# Patient Record
Sex: FEMALE | Race: WHITE | NOT HISPANIC OR LATINO | Employment: OTHER | URBAN - METROPOLITAN AREA
[De-identification: names, ages, dates, MRNs, and addresses within clinical notes are randomized per-mention and may not be internally consistent; named-entity substitution may affect disease eponyms.]

---

## 2017-01-13 ENCOUNTER — RECORDS - HEALTHEAST (OUTPATIENT)
Dept: LAB | Facility: CLINIC | Age: 77
End: 2017-01-13

## 2017-01-13 LAB
CHOLEST SERPL-MCNC: 223 MG/DL
FASTING STATUS PATIENT QL REPORTED: ABNORMAL
HDLC SERPL-MCNC: 48 MG/DL
LDLC SERPL CALC-MCNC: 148 MG/DL
TRIGL SERPL-MCNC: 134 MG/DL

## 2017-03-13 ASSESSMENT — MIFFLIN-ST. JEOR
SCORE: 1296.42
SCORE: 1325.46

## 2017-03-14 ASSESSMENT — MIFFLIN-ST. JEOR: SCORE: 1297.79

## 2017-03-15 ENCOUNTER — SURGERY - HEALTHEAST (OUTPATIENT)
Dept: SURGERY | Facility: CLINIC | Age: 77
End: 2017-03-15

## 2017-03-15 ENCOUNTER — ANESTHESIA - HEALTHEAST (OUTPATIENT)
Dept: SURGERY | Facility: CLINIC | Age: 77
End: 2017-03-15

## 2017-03-15 ASSESSMENT — MIFFLIN-ST. JEOR: SCORE: 1296.42

## 2017-03-16 ASSESSMENT — MIFFLIN-ST. JEOR: SCORE: 1330.9

## 2017-03-17 ASSESSMENT — MIFFLIN-ST. JEOR: SCORE: 1321.83

## 2017-03-18 ASSESSMENT — MIFFLIN-ST. JEOR: SCORE: 1312.75

## 2017-03-19 ASSESSMENT — MIFFLIN-ST. JEOR: SCORE: 1310.94

## 2017-10-03 ENCOUNTER — RECORDS - HEALTHEAST (OUTPATIENT)
Dept: ADMINISTRATIVE | Facility: OTHER | Age: 77
End: 2017-10-03

## 2017-10-09 ASSESSMENT — MIFFLIN-ST. JEOR: SCORE: 1278.28

## 2017-10-10 ENCOUNTER — ANESTHESIA - HEALTHEAST (OUTPATIENT)
Dept: SURGERY | Facility: HOSPITAL | Age: 77
End: 2017-10-10

## 2017-10-11 ENCOUNTER — SURGERY - HEALTHEAST (OUTPATIENT)
Dept: SURGERY | Facility: HOSPITAL | Age: 77
End: 2017-10-11

## 2017-10-19 ENCOUNTER — RECORDS - HEALTHEAST (OUTPATIENT)
Dept: ADMINISTRATIVE | Facility: OTHER | Age: 77
End: 2017-10-19

## 2017-10-23 ENCOUNTER — RECORDS - HEALTHEAST (OUTPATIENT)
Dept: ADMINISTRATIVE | Facility: OTHER | Age: 77
End: 2017-10-23

## 2017-10-24 ENCOUNTER — COMMUNICATION - HEALTHEAST (OUTPATIENT)
Dept: ONCOLOGY | Facility: HOSPITAL | Age: 77
End: 2017-10-24

## 2017-10-31 ENCOUNTER — AMBULATORY - HEALTHEAST (OUTPATIENT)
Dept: INFUSION THERAPY | Facility: HOSPITAL | Age: 77
End: 2017-10-31

## 2017-10-31 ENCOUNTER — AMBULATORY - HEALTHEAST (OUTPATIENT)
Dept: ONCOLOGY | Facility: HOSPITAL | Age: 77
End: 2017-10-31

## 2017-10-31 ENCOUNTER — OFFICE VISIT - HEALTHEAST (OUTPATIENT)
Dept: ONCOLOGY | Facility: HOSPITAL | Age: 77
End: 2017-10-31

## 2017-10-31 ENCOUNTER — HOSPITAL ENCOUNTER (OUTPATIENT)
Dept: CT IMAGING | Facility: HOSPITAL | Age: 77
Setting detail: RADIATION/ONCOLOGY SERIES
Discharge: STILL ADMITTED OR ADMINISTRATIVE | End: 2017-10-31
Attending: INTERNAL MEDICINE

## 2017-10-31 DIAGNOSIS — C67.5 MALIGNANT NEOPLASM OF URINARY BLADDER NECK (H): ICD-10-CM

## 2017-10-31 DIAGNOSIS — C67.8 MALIGNANT NEOPLASM OF OVERLAPPING SITES OF BLADDER (H): ICD-10-CM

## 2017-10-31 ASSESSMENT — MIFFLIN-ST. JEOR: SCORE: 1264.33

## 2017-11-02 ENCOUNTER — COMMUNICATION - HEALTHEAST (OUTPATIENT)
Dept: ONCOLOGY | Facility: HOSPITAL | Age: 77
End: 2017-11-02

## 2017-11-03 ENCOUNTER — COMMUNICATION - HEALTHEAST (OUTPATIENT)
Dept: ONCOLOGY | Facility: HOSPITAL | Age: 77
End: 2017-11-03

## 2017-11-20 ENCOUNTER — COMMUNICATION - HEALTHEAST (OUTPATIENT)
Dept: ONCOLOGY | Facility: HOSPITAL | Age: 77
End: 2017-11-20

## 2018-08-29 ENCOUNTER — RECORDS - HEALTHEAST (OUTPATIENT)
Dept: LAB | Facility: CLINIC | Age: 78
End: 2018-08-29

## 2018-08-29 LAB
ALBUMIN SERPL-MCNC: 3.6 G/DL (ref 3.5–5)
ALP SERPL-CCNC: 86 U/L (ref 45–120)
ALT SERPL W P-5'-P-CCNC: 11 U/L (ref 0–45)
ANION GAP SERPL CALCULATED.3IONS-SCNC: 10 MMOL/L (ref 5–18)
AST SERPL W P-5'-P-CCNC: 19 U/L (ref 0–40)
BILIRUB SERPL-MCNC: 0.7 MG/DL (ref 0–1)
BUN SERPL-MCNC: 23 MG/DL (ref 8–28)
CALCIUM SERPL-MCNC: 9.4 MG/DL (ref 8.5–10.5)
CHLORIDE BLD-SCNC: 106 MMOL/L (ref 98–107)
CO2 SERPL-SCNC: 26 MMOL/L (ref 22–31)
CREAT SERPL-MCNC: 0.91 MG/DL (ref 0.6–1.1)
GFR SERPL CREATININE-BSD FRML MDRD: 60 ML/MIN/1.73M2
GLUCOSE BLD-MCNC: 109 MG/DL (ref 70–125)
POTASSIUM BLD-SCNC: 4.2 MMOL/L (ref 3.5–5)
PROT SERPL-MCNC: 6.8 G/DL (ref 6–8)
SODIUM SERPL-SCNC: 142 MMOL/L (ref 136–145)
TSH SERPL DL<=0.005 MIU/L-ACNC: 5.1 UIU/ML (ref 0.3–5)

## 2019-10-25 ENCOUNTER — TRANSFERRED RECORDS (OUTPATIENT)
Dept: MULTI SPECIALTY CLINIC | Facility: CLINIC | Age: 79
End: 2019-10-25

## 2019-10-25 LAB
CREAT SERPL-MCNC: 1.08 MG/DL (ref 0.6–1.1)
GFR SERPL CREATININE-BSD FRML MDRD: 49 ML/MIN/1.73M2
GLUCOSE SERPL-MCNC: 280 MG/DL (ref 70–125)
HBA1C MFR BLD: 9 % (ref 4.2–6.1)
POTASSIUM SERPL-SCNC: 4 MMOL/L (ref 3.5–5)

## 2019-10-30 ENCOUNTER — RECORDS - HEALTHEAST (OUTPATIENT)
Dept: LAB | Facility: CLINIC | Age: 79
End: 2019-10-30

## 2019-10-30 LAB
ANION GAP SERPL CALCULATED.3IONS-SCNC: 7 MMOL/L (ref 5–18)
BUN SERPL-MCNC: 17 MG/DL (ref 8–28)
CALCIUM SERPL-MCNC: 9.3 MG/DL (ref 8.5–10.5)
CHLORIDE BLD-SCNC: 101 MMOL/L (ref 98–107)
CO2 SERPL-SCNC: 28 MMOL/L (ref 22–31)
CREAT SERPL-MCNC: 1.26 MG/DL (ref 0.6–1.1)
GFR SERPL CREATININE-BSD FRML MDRD: 41 ML/MIN/1.73M2
GLUCOSE BLD-MCNC: 360 MG/DL (ref 70–125)
POTASSIUM BLD-SCNC: 4.4 MMOL/L (ref 3.5–5)
SODIUM SERPL-SCNC: 136 MMOL/L (ref 136–145)

## 2019-10-31 ENCOUNTER — ALLIED HEALTH/NURSE VISIT (OUTPATIENT)
Dept: PHARMACY | Facility: PHYSICIAN GROUP | Age: 79
End: 2019-10-31
Payer: COMMERCIAL

## 2019-10-31 DIAGNOSIS — E03.9 HYPOTHYROIDISM, UNSPECIFIED TYPE: ICD-10-CM

## 2019-10-31 DIAGNOSIS — E09.10: Primary | ICD-10-CM

## 2019-10-31 DIAGNOSIS — C67.8 MALIGNANT NEOPLASM OF OVERLAPPING SITES OF BLADDER (H): ICD-10-CM

## 2019-10-31 DIAGNOSIS — I10 ESSENTIAL HYPERTENSION: ICD-10-CM

## 2019-10-31 DIAGNOSIS — Z78.9 TAKES DIETARY SUPPLEMENTS: ICD-10-CM

## 2019-10-31 DIAGNOSIS — R00.0 TACHYCARDIA: ICD-10-CM

## 2019-10-31 PROCEDURE — 99605 MTMS BY PHARM NP 15 MIN: CPT | Performed by: PHARMACIST

## 2019-10-31 PROCEDURE — 99607 MTMS BY PHARM ADDL 15 MIN: CPT | Performed by: PHARMACIST

## 2019-10-31 RX ORDER — LEVOTHYROXINE SODIUM 100 UG/1
100 TABLET ORAL
COMMUNITY
End: 2023-10-22

## 2019-10-31 RX ORDER — ALCOHOL 70.47 ML/100ML
1 GEL TOPICAL EVERY EVENING
COMMUNITY

## 2019-10-31 RX ORDER — ATENOLOL 50 MG/1
50 TABLET ORAL DAILY
COMMUNITY
End: 2023-10-22

## 2019-10-31 RX ORDER — DOCUSATE SODIUM 100 MG/1
100 CAPSULE, LIQUID FILLED ORAL PRN
COMMUNITY

## 2019-10-31 RX ORDER — BLOOD-GLUCOSE METER
EACH MISCELLANEOUS
COMMUNITY

## 2019-10-31 NOTE — PROGRESS NOTES
MTM Transitions of Care Encounter  Assessment & Plan:                          Post Discharge Medication Reconciliation Status: discharge medications reconciled, continue medications without change.    Medication Adherence: good, no issues identified    Diabetes-Type 1, likely 2/2 Keytruda: Needs Improvement and further monitoring.   Patient is not meeting A1c goal of < 7%. Self monitoring of blood glucose is not at goal of fasting  mg/dL and post prandial < 180 mg/dL. Insulin dose, timing, efficacy, and SMBG goals were reviewed. Patient would benefit from meeting with CDE to review carb content of foods and hypoglycemia correction. Reasonable to continue current Novolin 70/30 dose and continue monitoring given hypoglycemic episode yesterday.  If beta-cell dysfunction persists despite stopping Keytruda or if Keytruda tx continued she would benefit from switching mix insulin to basal/bolus regimen. Differences in insulin products briefly reviewed with patient, ongoing PharmD follow-up recommended.  - Continue Novolin 70/30 17 units w/ breakfast and 6 units w/ dinner, consider switching to rapid/long-acting regimen at follow-up  - Continue to monitor BG fasting, pre-dinner, and 2-hours post-prandial  - Meet with CDE in clinic   - Patient will schedule appt with Endocrinology    Bladder cancer: Needs further assessment  Reported incidence of Type 1 diabetes mellitus (including DKA) with Keytruda therapy is 0.2%. It is recommended to treat hyperglycemia with insulin and withhold pembrolizumab treatment until glucose control has been accomplished. Completed 1 year of 2 year maximum duration of Keytruda, she would benefit from further discussion with Oncologist regarding risks vs benefits of resuming treatment if hyperglycemia improves.  - Recommend not resuming Keytruda until glycemia is better controlled  - Patient will discuss plan for ongoing Keytruda therapy with Oncologist    Hypothyroidism: Stable- Last TSH  is within normal limits.  Current levothyroxine therapy is appropriate, effective, safe/tolerated, and convenient for patient.  - Continue levothyroxine 100 mcg daily    Hypertension, Tachycardia: Stable- Patient is meeting BP goal of < 130/80mmHg and HR <80. Current atenolol therapy is appropriate, effective, safe/tolerated, and convenient for patient. Potential for atenolol to mask some symptoms of hypoglycemia reviewed with patient,  - Continue atenolol 50 mg daily    Supplements: Stable.  - Continue multivitamin daily    Follow-up: By phone in 1-2 month(s)     Subjective & Objective                                                     Mi Paul is a 79 year old female called for a transitions of care visit.  She was discharged from [facility] on 10/29/2019 for DKA with new diabetes diagnosis.     Chief Complaint: Hospital follow-up comprehensive medication review and education.     Allergies/ADRs: No known drug allergies, possible adverse rxn with Keytruda  Tobacco: History of tobacco dependence - quit 1967  Alcohol: 4-6 beverages / week, wine  PMH: Reviewed in HE Epic and eCW, significant for bladder cancer s/p cystectomy, HTN, hyperthyroidism, Afib, SBO    Medication Adherence/Access:  Patient takes medications directly from bottles.  Patient takes oral medications 1 and insulin  2 time(s) per day.   Per patient, misses medication 0 times per week.   Medication barriers: No issues reported.     Diabetes-Type 1, likely 2/2 Keytruda:  Current medications: Novolin Mix (Relion) 70/30 insulin pens: 17 units before breakfast and 6 units before dinner  Newly diagnosed diabetes after BG found to be >600 on routine labs and hospitalized with DKA. Suspicion new onset diabetes is secondary to Keytruda therapy she was taking for bladder cancer. She has a referral to Endocrinology but hasn't scheduled appt yet. She asks if diabetes can be managed by her PCP.  SMBG (OneTouch Verio Flex): 3-4 times daily.    Ranges (patient reported): Fastin-280  Patient experienced 1 episode of hypoglycemia (BG 61) since discharge after giving wrong dose of insulin, Symptoms of low blood sugar? shaky, weak, sweaty.    Yesterday messed up insulin dose: gave 17 units last night instead of 6 units, caused her BG to go down to 61 (9pm). Drank several glasses of orange juice before bed to get BG up and this morning BG was 562. She took her usual 17 units of Novolin mix this AM with breakfast. Had pt check her BG during our phone visit (10:45 am) and it was 224.  Recent symptoms of high blood sugar? polydipsia and polyuria, nausea and vomiting prior to hospitalization  ACEi/ARB: No. Urine Albumin: No results found for: UMALCR   Aspirin: No  Diet/Exercise: Watching carb amounts in diet since discharge, typically eats a well-balanced diet    Glycosylated Hemoglobin A1C (10/25/2019 7:21 AM CDT)  Component Value Ref Range Performed At   Hemoglobin A1c 9.0 (H) 4.2 - 6.1 % [facility]     Beta-hydroxybutyrate (replaces Serum Ketone)   Component 10/25/2019 10/24/2019 Ref Range Performed At   Beta-Hydroxybutyrate (BHOB) 0.16 0.88 <=0.3 mmol/L [facility]   C-Peptide (10/25/2019 12:49 AM CDT)  Component Value Ref Range Performed At   C Peptide 0.7 (L) 0.9 - 6.9 ng/mL [facility]       Bladder cancer: Chemotherapy: Keytruda (pembrolizumab), started: 10/10/2018, last dose, cycle 17 on 10/24/2019  Patient has f/u appt scheduled with oncologist to discuss plan regarding ongoing Keytruda therapy with new diabetes dx suspected to be an adverse effect of therapy. She reports good response in cancer suppression with Keytruda and is concerned about stopping this treatment.     Hypothyroidism: Current therapy: levothyroxine 100 mcg daily.   Dose increased 3/2019 after elevated TSH found by oncology. Patient is having the following symptoms: none.   Recent TSH:  2019 3/21/2019 2018 2018 10/2/2017   1.59 15.08  (H) 5.10 (H) 5.10 (H) 3.07     Hypertension, Tachycardia: Current medications: atenolol 50 mg once daily  Patient does not self-monitor BP.  Patient reports no current medication side effects.    Supplements: Current medications: multivitamin 1 tablet daily  Denies taking any other herbal products or dietary supplements.     Labs:  Basic Metabolic Panel, Resulted: 10/30/2019 6:34 PM-[facility]  Component Name Value Ref Range   Sodium 136 136 - 145 mmol/L   Potassium 4.4 3.5 - 5 mmol/L   Chloride 101 98 - 107 mmol/L   CO2 28 22 - 31 mmol/L   Anion Gap, Calculation 7 5 - 18 mmol/L   Glucose 360 (H) 70 - 125 mg/dL   Calcium 9.3 8.5 - 10.5 mg/dL   BUN 17 8 - 28 mg/dL   Creatinine 1.26 (H) 0.6 - 1.1 mg/dL   GFR MDRD Af Amer 50 (L) >60 mL/min/1.73m2   GFR MDRD Non Af Amer 41 (L) >60 mL/min/1.73m2     Today's Vitals: There were no vitals taken for this visit-telephone encounter  Last Vitals Prior to Discharge:   Vital Sign Reading Time Taken   Blood Pressure 123/56 10/29/2019 12:01 AM CDT   Pulse 62 10/29/2019 12:01 AM CDT     ___________  I spent 60 minutes with this patient today. I offer these suggestions for consideration by Dr. Malik Rutherford MD. A copy of the visit note was provided to the patient's primary care provider.    The patient was mailed a summary of these recommendations as an after visit summary.     Katarina Moreno, PharmD  Medication Therapy Management Pharmacist  [facility]  Pager: [phone]

## 2019-11-09 NOTE — PATIENT INSTRUCTIONS
Recommendations from today's MTM visit:                                                    MTM (medication therapy management) is a service provided by a clinical pharmacist designed to help you get the most of out of your medicines.   Today we reviewed what your medicines are for, how to know if they are working, that your medicines are safe and how to make your medicine regimen as easy as possible.     Taking Insulin:    Take Novolin Mix (Relion) 70/30 insulin 30 to 45 minutes before eating breakfast and dinner    Before injecting gently move the insulin pen up and down 20 times so the glass ball moves from one end of the cartridge to the other until the suspension appears uniformly white and cloudy, then inject immediately    After you inject, hold the needle under the skin to the count of 10 to be sure all of the insulin goes in.     Rotate injection sites, keeping at least 1 inch apart from last injection site and 2 inches away from belly button or surgical scars.    Use a new pen needle for each injection. Do not reuse.     Store insulin you are not using in the refrigerator (do not freeze). Take new insulin out of the refrigerator a few hours prior to use to bring to room temperature.     Novolin Mix (Relion) pen  is good for 28 days at room temperature. Do not use the opened insulin after this time has passed, even if there is still medicine inside.     Always carry your blood sugar meter and a sugar source like glucose tablets with you in case of a low blood sugar. Treat a low blood sugar (less than 70) with 15 grams of carbohydrate. Wait 15 minutes, recheck blood sugar. If blood sugar is still below 70, repeat the treatment.    Wear Medical ID or carry a wallet card stating you have Diabetes.    Call your doctor, diabetes educator, or MTM pharmacist if you begin having low blood sugars or if you have questions or concerns.     Hypoglycemia or low blood sugar means: blood sugar levels below 70mg/dL, which  "may also include feelings of shakiness, racing heart, sweating, nervousness, dizziness, irritability, being hungry, impaired vision, weakness, fatigue, headache.    What to do:  - If mealtime, eat immediately  - If not mealtime, have 15grams of carbohydrates (3 glucose tablets, 1 cup juice or non-diet soda, 3 tsps table sugar, 3 tsps honey)  - Retest blood sugar in 10-20 minutes.  If blood sugar is still below 70mg/dL, have another 15grams of carbohydrates and re-test every 10-20 minutes until hypoglycemia resolves    If this is a one time thing or happens very rarely, we can continue your current medications. However, if this starts to happen regularly (several times a week), we will need to REDUCE your medications.      I included information about different types of insulin. Your blood sugar may be easier to control with using a more stable \"long-acting\" insulin and a faster, short-acting, \"mealtime\" insulin.      For diabetes caused by Keytruda, it is recommended to stop Keytruda at least until the blood sugar is under control. Recommend talking with your Oncologist about the plan for your chemotherapy treatment.     It was great to speak with you today.  I value your experience and would be very thankful for your time with providing feedback on our clinic survey. You may receive a survey via email or text message in the next few days.     Next MTM visit: By phone, on Wednesday, December 12, 2019 at 10:30 am      To schedule another MTM appointment, please call the clinic directly or you may call the MTM scheduling line at [phone] or toll-free at [phone].     My Clinical Pharmacist's contact information:                                                      It was a pleasure talking with you today!  Please feel free to contact me with any questions or concerns you have.      Katarina Moreno, PharmD  Medication Therapy Management Pharmacist  [facility]  Pager: [phone]        My " healthcare goals:                                                      Diabetes Goals:     Home Monitoring of Blood Sugars:  Morning Pre-meal: 80-130mg/dl  Before a Meal: 80-130mg/dl  2-Hours After a Meal:  mg/dl  Before and After Meal Difference: Less than 40 points    Hemoglobin A1C: Less than 7%. Yours is 9%.  This is a 3 month average of your blood sugars. We should do this test every 3 to 6 months depending on your diabetes control.     Blood Pressure: Less than 130/80 mmHg. Yours last blood pressure was 123/56 mmHg.    Cholesterol: You do not need any additional cholesterol medicine at this time.    By being more aware of your goals for your healthcare, you can take a more active role in managing your medications and lifestyle changes. We all need to work together to help you get the best healthcare.

## 2019-12-12 ENCOUNTER — ALLIED HEALTH/NURSE VISIT (OUTPATIENT)
Dept: PHARMACY | Facility: PHYSICIAN GROUP | Age: 79
End: 2019-12-12
Payer: COMMERCIAL

## 2019-12-12 DIAGNOSIS — K59.00 CONSTIPATION, UNSPECIFIED CONSTIPATION TYPE: ICD-10-CM

## 2019-12-12 DIAGNOSIS — E10.9 TYPE 1 DIABETES MELLITUS WITHOUT COMPLICATION (H): Primary | ICD-10-CM

## 2019-12-12 DIAGNOSIS — C67.8 MALIGNANT NEOPLASM OF OVERLAPPING SITES OF BLADDER (H): ICD-10-CM

## 2019-12-12 PROCEDURE — 99607 MTMS BY PHARM ADDL 15 MIN: CPT | Performed by: PHARMACIST

## 2019-12-12 PROCEDURE — 99606 MTMS BY PHARM EST 15 MIN: CPT | Performed by: PHARMACIST

## 2019-12-12 NOTE — Clinical Note
Please abstract the following data from this visit with this patient into the appropriate field in Epic:Tests that can be patient reported without a hard copy:{Quality Abstract List (Optional):448590}Other Tests found in the patient's chart through Chart Review/Care Everywhere:A1c done by this [facility] on this date: 10/25/2019Note to Abstraction: If this section is blank, no results were found via Chart Review/Care Everywhere.

## 2019-12-12 NOTE — PROGRESS NOTES
MTM Follow-up Encounter  Assessment & Plan:                             Medication Adherence: excellent, no issues identified    ***    ***: ***  - ***    { :634498}.    Follow-up: By Phone in 3 months    -Increase dietary fiber, try using Miralax 3x/week  - Consider trying Freestyle Navdeep (covered by insurance)  - COntinue current insulin     Subjective & Objective                                                     Mi Paul is a 79 year old female { :[id number]} for a follow-up visit for Medication Therapy Management.  She was referred to me from ***.     Chief Complaint: Follow-up from MTM visit on 10/31/19 to check-in on insulin and BG.   Plan from last visit:     Tobacco: History of tobacco dependence - quit 1967  Alcohol: 4-6 beverages / week, wine  PMH: Reviewed in HE Epic and eCW, significant for bladder cancer s/p cystectomy, HTN, hyperthyroidism, Afib, SBO    Medication Adherence/Access: no issues reported    Type 2 Diabetes:  Current medications: Novolin 70/30 Flexpen 19 units in AM, 14 units before dinner  Pt is not experiencing side effects.  SMBG: before meals (vary times), 2 hours after meals (vary times).   Ranges (patient reported): *** this morning-82, went up to 99. Tues before dinner 331, week ago Monday 380 before dinner and 384 before bedtime   Patient is experiencing hypoglycemia, last night: 52->corrected then 58 before bed so she ate. Frequency of hypoglycemia? 1-2 times per week. Symptoms of low blood sugar? shaky, sweaty, dizzy, weak.   Recent symptoms of high blood sugar? None currently, fatigue and polydipsia improving   Eye exam: unknown, Foot exam: unknown  ACEi/ARB: {YES (EXPLAIN)/NO:843808}. Urine Albumin: ***  Aspirin: Not taking, further assessment at f/u  Diet/Exercise: ***  Endo checking JODI antibody, results not available yet.         C-PEPTIDE (12/10/2019 3:01 PM CST)  C-PEPTIDE  0.32 (L) 0.78 - 5.19 ng/mL [facility]         Bladder cancer: Chemotherapy: Keytruda  (pembrolizumab), started: 10/10/2018, last dose, cycle *** on ***  Patient has f/u appt scheduled with oncologist to discuss plan regarding ongoing Keytruda therapy with new diabetes dx suspected to be an adverse effect of therapy. She reports good response in cancer suppression with Keytruda and is concerned about stopping this treatment.     Constipation: Current medications: docusate 100 mg as needed  Experiencing increased constipation lately and wonders if it's from change in insulin or blood sugar.     Today's Vitals: There were no vitals taken for this visit.-telephone encounter  Last Filed Vital Signs  Vital Sign Reading Time Taken   Blood Pressure 112/58 12/10/2019 2:09 PM CST   Pulse 64 12/10/2019 2:09 PM CST   Respiratory Rate 16 12/10/2019 2:09 PM CST   Weight 73 kg (161 lb) 12/10/2019 2:09 PM CST   Body Mass Index 25.99 01/17/2018 6:32 AM CST         ___________  I spent 45 minutes with this patient today. I offer these suggestions for consideration by  . A copy of the visit note was provided to the patient's primary care provider.    The patient declined a summary of these recommendations as an after visit summary.     Katarina Moreno, PharmD  Medication Therapy Management Pharmacist  [facility]  Pager: [phone]

## 2019-12-16 RX ORDER — FLASH GLUCOSE SENSOR
KIT MISCELLANEOUS
COMMUNITY

## 2019-12-16 RX ORDER — FLASH GLUCOSE SCANNING READER
EACH MISCELLANEOUS
COMMUNITY

## 2019-12-16 RX ORDER — POLYETHYLENE GLYCOL 3350 17 G/17G
1 POWDER, FOR SOLUTION ORAL DAILY PRN
COMMUNITY

## 2019-12-16 NOTE — PROGRESS NOTES
MTM Follow-up Encounter  Assessment & Plan:                           Medication Adherence: excellent, no issues identified    1. Type 1 diabetes mellitus: Needs further monitoring.  Patient is not meeting A1c goal of < 7%. Self monitoring of blood glucose is improved and mostly at goal of fasting  mg/dL and post-prandial <180 mg/dL. Concerned about pre-bedtime hypoglycemia and given testing burden she may benefit from using CGM. Navdeep is typically covered by [facility] Part D benefits.  - Continue Novolin 70/30 as recommended by Endocrinology   - Recommend ordering FreeStyle Navdeep CGM, order pended for provider review and approval    2. Malignant neoplasm of overlapping sites of bladder (H): Stable.    3. Constipation: Needs improvement.  Patient may benefit from increasing dietary fiber and using Miralax daily until constipation improves, then 3 times a week or as needed.   - Add more foods with fiber to diet  - Try polyethylene glycol (MIRALAX) powder; Mix 1 capful with 8 ounces of liquid and drink daily as needed for constipation  - Continue docusate 100 mg daily as needed    I offer these suggestions for consideration by Dr. Rutherford.    PharmD Follow-up: By Phone in 3 months    Subjective & Objective                                                     Mi Paul is a 79 year old female called for a follow-up visit for Medication Therapy Management.  She was referred to me after previous hospital discharge based on complexity.     Chief Complaint: Follow-up from MTM visit on 10/31/19 to check-in on insulin and BG.   Plan from last visit:   - Continue Novolin 70/30 17 units w/ breakfast and 6 units w/ dinner, consider switching to rapid/long-acting regimen at follow-up  - Continue to monitor BG fasting, pre-dinner, and 2-hours post-prandial  - Meet with CDE in clinic   - Patient will schedule appt with Endocrinology  - Recommend not resuming Keytruda until glycemia is better controlled  - Patient will discuss  plan for ongoing Keytruda therapy with Oncologist    Tobacco: History of tobacco dependence - quit 1967  Alcohol: 4-6 beverages / week, wine  PMH: Reviewed in HE Epic and eCW, significant for bladder cancer s/p cystectomy, HTN, hyperthyroidism, Afib, SBO  Medication Sig   Pembrolizumab (KEYTRUDA IV)    atenolol (TENORMIN) 50 MG tablet Take 50 mg by mouth daily    blood glucose (ONETOUCH VERIO IQ) test strip Use to test blood sugar 4 times daily or as directed.   blood glucose monitoring (ONE TOUCH DELICA) lancets Use to test blood sugar 4 times daily or as directed.   blood glucose monitoring (ONETOUCH VERIO) meter device kit Use to test blood sugar 4 times daily or as directed.   docusate sodium (COLACE) 100 MG capsule Take 100 mg by mouth as needed for constipation   insulin isophane & regular (NOVOLIN 70/30 FLEXPEN RELION) susp Inject 19 units under the skin 30 minutes before breakfast and 14 units 30 minutes before dinner.   levothyroxine (SYNTHROID/LEVOTHROID) 100 MCG tablet Take 100 mcg by mouth every morning (before breakfast)    multivitamin (THERMEMS) TABS Take 1 tablet by mouth every evening      Medication Adherence/Access: no issues reported    Type 2 Diabetes:  Current medications: Novolin 70/30 Flexpen 19 units in AM, 14 units before dinner  Patient met with CDE and established care with Endocrinologist since our last visit. Patient is comfortable with administration and denies experiencing side effects, other than hypoglycemia.  SMBG: before meals (vary times), 2 hours after meals (vary times), admits she is starting to feel overwhelmed with frequent finger sticks and monitoring.  BG Ranges (patient reported): Fastin-120's, this morning-82, went up to 99 after drinking juice before giving AM Novolin prior to breakfast. Post-prandial: most <200, highest BG last week: Tues before dinner 331, week ago Monday 380 before dinner and 384 before bedtime   Patient is experiencing hypoglycemia, last night:  52->corrected and rechecked 15-20 mins later- 58, so she ate a bag of popcorn before going to bed. Frequency of hypoglycemia? 1-2 times per week. Symptoms of low blood sugar? shaky, sweaty, dizzy, weak.   Recent symptoms of high blood sugar? None currently, fatigue and polydipsia improving   Eye exam: unknown, Foot exam: unknown  ACEi/ARB: No. Urine Albumin: unknown  Aspirin: Not taking, further assessment at f/u  Diet/Exercise: Counting carbs and following recommendations provided by CDE. She is trying to maintain consistent meal schedule with 3 balanced meals a day and eating small snack at bedtime.   Endo checking JODI antibody, results not available yet.   C-PEPTIDE (12/10/2019 3:01 PM CST) 0.32 (L) 0.78 - 5.19 ng/mL [facility]     Glycosylated Hemoglobin A1C (10/25/2019 7:21 AM CDT)  Component Value Ref Range Performed At   Hemoglobin A1c 9.0 (H) 4.2 - 6.1 % [facility]     Bladder cancer: Chemotherapy: Keytruda (pembrolizumab), started: 10/10/2018  Patient had f/u with oncologist and discussed benefits vs risks of continuing Keytruda. Reports oncologist thought incidence of T1DM with Keytruda is very rare and recommended completing remaining 9 months of 24 month course of treatment. Patient is comfortable with this treatment plan and understands risks.     Constipation: Current medications: docusate 100 mg as needed  Experiencing increased constipation lately and wonders if it's from change in insulin or blood sugar. Denies any issues with loose stools.    Today's Vitals: There were no vitals taken for this visit.-telephone encounter  Last Filed Vital Signs  Vital Sign Reading Time Taken   Blood Pressure 112/58 12/10/2019 2:09 PM CST   Pulse 64 12/10/2019 2:09 PM CST   Respiratory Rate 16 12/10/2019 2:09 PM CST   Weight 73 kg (161 lb) 12/10/2019 2:09 PM CST   Body Mass Index 25.99 01/17/2018 6:32 AM CST     ___________  I spent 45 minutes with this patient today. I offer these suggestions for  consideration by  . A copy of the visit note was provided to the patient's primary care provider.    The patient declined a summary of these recommendations as an after visit summary.     Katarina Moreno, PharmD  Medication Therapy Management Pharmacist  [facility]  Pager: [phone]

## 2020-01-02 ENCOUNTER — RECORDS - HEALTHEAST (OUTPATIENT)
Dept: LAB | Facility: CLINIC | Age: 80
End: 2020-01-02

## 2020-01-02 LAB
MAGNESIUM SERPL-MCNC: 1.6 MG/DL (ref 1.8–2.6)
TSH SERPL DL<=0.005 MIU/L-ACNC: 2.58 UIU/ML (ref 0.3–5)

## 2020-01-24 ENCOUNTER — RECORDS - HEALTHEAST (OUTPATIENT)
Dept: LAB | Facility: CLINIC | Age: 80
End: 2020-01-24

## 2020-01-24 LAB
ALBUMIN SERPL-MCNC: 3.4 G/DL (ref 3.5–5)
ALP SERPL-CCNC: 73 U/L (ref 45–120)
ALT SERPL W P-5'-P-CCNC: 13 U/L (ref 0–45)
ANION GAP SERPL CALCULATED.3IONS-SCNC: 8 MMOL/L (ref 5–18)
AST SERPL W P-5'-P-CCNC: 22 U/L (ref 0–40)
BILIRUB SERPL-MCNC: 0.8 MG/DL (ref 0–1)
BUN SERPL-MCNC: 18 MG/DL (ref 8–28)
CALCIUM SERPL-MCNC: 8.8 MG/DL (ref 8.5–10.5)
CHLORIDE BLD-SCNC: 102 MMOL/L (ref 98–107)
CHOLEST SERPL-MCNC: 103 MG/DL
CO2 SERPL-SCNC: 28 MMOL/L (ref 22–31)
CREAT SERPL-MCNC: 1.06 MG/DL (ref 0.6–1.1)
FASTING STATUS PATIENT QL REPORTED: ABNORMAL
GFR SERPL CREATININE-BSD FRML MDRD: 50 ML/MIN/1.73M2
GLUCOSE BLD-MCNC: 279 MG/DL (ref 70–125)
HDLC SERPL-MCNC: 37 MG/DL
LDLC SERPL CALC-MCNC: 54 MG/DL
MAGNESIUM SERPL-MCNC: 1.5 MG/DL (ref 1.8–2.6)
POTASSIUM BLD-SCNC: 4.6 MMOL/L (ref 3.5–5)
PROT SERPL-MCNC: 6.3 G/DL (ref 6–8)
SODIUM SERPL-SCNC: 138 MMOL/L (ref 136–145)
TRIGL SERPL-MCNC: 61 MG/DL

## 2020-03-11 ENCOUNTER — HEALTH MAINTENANCE LETTER (OUTPATIENT)
Age: 80
End: 2020-03-11

## 2020-05-28 ENCOUNTER — RECORDS - HEALTHEAST (OUTPATIENT)
Dept: LAB | Facility: CLINIC | Age: 80
End: 2020-05-28

## 2020-05-29 LAB
ALBUMIN SERPL-MCNC: 3.1 G/DL (ref 3.5–5)
ALP SERPL-CCNC: 1583 U/L (ref 45–120)
ALT SERPL W P-5'-P-CCNC: 203 U/L (ref 0–45)
ANION GAP SERPL CALCULATED.3IONS-SCNC: 9 MMOL/L (ref 5–18)
AST SERPL W P-5'-P-CCNC: 331 U/L (ref 0–40)
BILIRUB SERPL-MCNC: 2.7 MG/DL (ref 0–1)
BUN SERPL-MCNC: 22 MG/DL (ref 8–28)
CALCIUM SERPL-MCNC: 8.8 MG/DL (ref 8.5–10.5)
CHLORIDE BLD-SCNC: 100 MMOL/L (ref 98–107)
CO2 SERPL-SCNC: 26 MMOL/L (ref 22–31)
CREAT SERPL-MCNC: 1.15 MG/DL (ref 0.6–1.1)
GFR SERPL CREATININE-BSD FRML MDRD: 46 ML/MIN/1.73M2
GLUCOSE BLD-MCNC: 297 MG/DL (ref 70–125)
POTASSIUM BLD-SCNC: 4.6 MMOL/L (ref 3.5–5)
PROT SERPL-MCNC: 6.7 G/DL (ref 6–8)
SODIUM SERPL-SCNC: 135 MMOL/L (ref 136–145)

## 2020-06-01 LAB — BACTERIA SPEC CULT: NORMAL

## 2020-06-04 ENCOUNTER — RECORDS - HEALTHEAST (OUTPATIENT)
Dept: LAB | Facility: CLINIC | Age: 80
End: 2020-06-04

## 2020-06-04 LAB
ALBUMIN SERPL-MCNC: 2.7 G/DL (ref 3.5–5)
ALP SERPL-CCNC: 1154 U/L (ref 45–120)
ALT SERPL W P-5'-P-CCNC: 231 U/L (ref 0–45)
AST SERPL W P-5'-P-CCNC: 279 U/L (ref 0–40)
BILIRUB DIRECT SERPL-MCNC: 0.8 MG/DL
BILIRUB SERPL-MCNC: 1.3 MG/DL (ref 0–1)
PROT SERPL-MCNC: 6.4 G/DL (ref 6–8)

## 2020-08-13 ENCOUNTER — COMMUNICATION - HEALTHEAST (OUTPATIENT)
Dept: SCHEDULING | Facility: CLINIC | Age: 80
End: 2020-08-13

## 2021-01-03 ENCOUNTER — HEALTH MAINTENANCE LETTER (OUTPATIENT)
Age: 81
End: 2021-01-03

## 2021-02-04 ENCOUNTER — AMBULATORY - HEALTHEAST (OUTPATIENT)
Dept: NURSING | Facility: CLINIC | Age: 81
End: 2021-02-04

## 2021-02-25 ENCOUNTER — AMBULATORY - HEALTHEAST (OUTPATIENT)
Dept: NURSING | Facility: CLINIC | Age: 81
End: 2021-02-25

## 2021-04-08 ENCOUNTER — RECORDS - HEALTHEAST (OUTPATIENT)
Dept: LAB | Facility: CLINIC | Age: 81
End: 2021-04-08

## 2021-04-08 LAB
ALBUMIN SERPL-MCNC: 3.4 G/DL (ref 3.5–5)
ALP SERPL-CCNC: 86 U/L (ref 45–120)
ALT SERPL W P-5'-P-CCNC: 13 U/L (ref 0–45)
ANION GAP SERPL CALCULATED.3IONS-SCNC: 9 MMOL/L (ref 5–18)
AST SERPL W P-5'-P-CCNC: 20 U/L (ref 0–40)
BILIRUB SERPL-MCNC: 0.6 MG/DL (ref 0–1)
BUN SERPL-MCNC: 25 MG/DL (ref 8–28)
CALCIUM SERPL-MCNC: 8.8 MG/DL (ref 8.5–10.5)
CHLORIDE BLD-SCNC: 102 MMOL/L (ref 98–107)
CHOLEST SERPL-MCNC: 170 MG/DL
CO2 SERPL-SCNC: 29 MMOL/L (ref 22–31)
CREAT SERPL-MCNC: 1.04 MG/DL (ref 0.6–1.1)
FASTING STATUS PATIENT QL REPORTED: NORMAL
GFR SERPL CREATININE-BSD FRML MDRD: 51 ML/MIN/1.73M2
GLUCOSE BLD-MCNC: 238 MG/DL (ref 70–125)
HDLC SERPL-MCNC: 51 MG/DL
LDLC SERPL CALC-MCNC: 104 MG/DL
POTASSIUM BLD-SCNC: 4.2 MMOL/L (ref 3.5–5)
PROT SERPL-MCNC: 6.3 G/DL (ref 6–8)
SODIUM SERPL-SCNC: 140 MMOL/L (ref 136–145)
TRIGL SERPL-MCNC: 77 MG/DL
TSH SERPL DL<=0.005 MIU/L-ACNC: 0.78 UIU/ML (ref 0.3–5)

## 2021-04-25 ENCOUNTER — HEALTH MAINTENANCE LETTER (OUTPATIENT)
Age: 81
End: 2021-04-25

## 2021-05-25 ENCOUNTER — RECORDS - HEALTHEAST (OUTPATIENT)
Dept: ADMINISTRATIVE | Facility: CLINIC | Age: 81
End: 2021-05-25

## 2021-05-27 ENCOUNTER — RECORDS - HEALTHEAST (OUTPATIENT)
Dept: ADMINISTRATIVE | Facility: CLINIC | Age: 81
End: 2021-05-27

## 2021-05-28 ENCOUNTER — RECORDS - HEALTHEAST (OUTPATIENT)
Dept: ADMINISTRATIVE | Facility: CLINIC | Age: 81
End: 2021-05-28

## 2021-05-29 ENCOUNTER — RECORDS - HEALTHEAST (OUTPATIENT)
Dept: ADMINISTRATIVE | Facility: CLINIC | Age: 81
End: 2021-05-29

## 2021-05-30 VITALS — HEIGHT: 67 IN | WEIGHT: 178.2 LBS | BODY MASS INDEX: 27.97 KG/M2

## 2021-05-31 VITALS — HEIGHT: 67 IN | BODY MASS INDEX: 26.49 KG/M2 | WEIGHT: 168.8 LBS

## 2021-05-31 VITALS — WEIGHT: 171 LBS | HEIGHT: 67 IN | BODY MASS INDEX: 26.84 KG/M2

## 2021-06-02 ENCOUNTER — RECORDS - HEALTHEAST (OUTPATIENT)
Dept: ADMINISTRATIVE | Facility: CLINIC | Age: 81
End: 2021-06-02

## 2021-06-09 NOTE — ANESTHESIA POSTPROCEDURE EVALUATION
Patient: Mi Paul    LAPAROTOMY; LYSIS OF ADHESIONS  Anesthesia type: general    Patient location: PACU  Last vitals:     Post vital signs: stable  Level of consciousness: awake and responds to simple questions  Post-anesthesia pain: pain controlled  Post-anesthesia nausea and vomiting: no  Pulmonary: unassisted, return to baseline  Cardiovascular: stable and blood pressure at baseline  Hydration: adequate  Anesthetic events: no    QCDR Measures:  ASA# 11 - Katie-op Cardiac Arrest: ASA11B - Patient did NOT experience unanticipated cardiac arrest  ASA# 12 - Katie-op Mortality Rate: ASA12B - Patient did NOT die  ASA# 13 - PACU Re-Intubation Rate: ASA13B - Patient did NOT require a new airway mgmt  ASA# 10 - Composite Anes Safety: ASA10A - No serious adverse event  ASA# 38 - New Corneal Injury: ASA38A - No new exposure keratitis or corneal abrasion in PACU    Additional Notes:

## 2021-06-09 NOTE — ANESTHESIA CARE TRANSFER NOTE
Last vitals:   Vitals:    03/15/17 1312   BP: 129/62   Pulse: 76   Resp: 14   Temp: 36.4  C (97.6  F)   SpO2:      Patient's level of consciousness is drowsy  Spontaneous respirations: yes  Maintains airway independently: yes  Dentition unchanged: yes  Oropharynx: oropharynx clear of all foreign objects    QCDR Measures:  ASA# 20 - Surgical Safety Checklist: ASA20A - Safety Checks Done  PQRS# 430 - Adult PONV Prevention: 4558F - Pt received => 2 anti-emetic agents (different classes) preop & intraop  ASA# 8 - Peds PONV Prevention: NA - Not pediatric patient, not GA or 2 or more risk factors NOT present  PQRS# 424 - Katie-op Temp Management: 4559F - At least one body temp DOCUMENTED => 35.5C or 95.9F within required timeframe  PQRS# 426 - PACU Transfer Protocol: - Transfer of care checklist used  ASA# 14 - Acute Post-op Pain: ASA14B - Patient did NOT experience pain >= 7 out of 10    I completed my SBAR handoff to the receiving nurse per policy and procedure.

## 2021-06-09 NOTE — ANESTHESIA PREPROCEDURE EVALUATION
Anesthesia Evaluation      Patient summary reviewed   History of anesthetic complications (PONV)     Airway   Mallampati: II  Neck ROM: full   Pulmonary - negative ROS and normal exam                          Cardiovascular - normal exam  Exercise tolerance: > or = 4 METS  (+) hypertension, dysrhythmias, ,     ECG reviewed        Neuro/Psych - negative ROS     Endo/Other    (+) hypothyroidism,      GI/Hepatic/Renal - negative ROS           Dental      Comment: Missing tooth                         Anesthesia Plan  Planned anesthetic: general endotracheal    ASA 2 - emergent   Induction: intravenous   Anesthetic plan and risks discussed with: patient  Anesthesia plan special considerations: antiemetics,   Post-op plan: routine recovery

## 2021-06-13 NOTE — PROGRESS NOTES
I stopped in today and met Chago at her consult appt with Dr. Redmond.  She is planning to have a cystectomy and urostomy soon. She states she is a bit nervous as this is a life changing event. She has been on the [place] urostomy web site and knows about the support group.  She has a lot of support from family.  I explained my role and encouraged that she call me if needed for any resources.

## 2021-06-13 NOTE — ANESTHESIA CARE TRANSFER NOTE
Last vitals:   Vitals:    10/11/17 0910   BP: 127/61   Pulse: 68   Resp: 14   Temp: 36.8  C (98.2  F)   SpO2: 100%   Dr. Vasquez ordered Lasix 5 mg to be given in PACU by PACU RN.  AMAYA Cook notified and order placed.    Patient's level of consciousness is drowsy  Spontaneous respirations: yes  Maintains airway independently: yes  Dentition unchanged: yes  Oropharynx: oropharynx clear of all foreign objects    QCDR Measures:  ASA# 20 - Surgical Safety Checklist: WHO surgical safety checklist completed prior to induction  PQRS# 430 - Adult PONV Prevention: 4558F - Pt received => 2 anti-emetic agents (different classes) preop & intraop  ASA# 8 - Peds PONV Prevention: NA - Not pediatric patient, not GA or 2 or more risk factors NOT present  PQRS# 424 - Katie-op Temp Management: 4559F - At least one body temp DOCUMENTED => 35.5C or 95.9F within required timeframe  PQRS# 426 - PACU Transfer Protocol: - Transfer of care checklist used  ASA# 14 - Acute Post-op Pain: ASA14B - Patient did NOT experience pain >= 7 out of 10

## 2021-06-13 NOTE — ANESTHESIA POSTPROCEDURE EVALUATION
Patient: Mi Paul  TRANSURETHRAL RESECTION OF BLADDER TUMOR  Anesthesia type: general    Patient location: PACU  Last vitals:   Vitals:    10/11/17 1115   BP: 127/61   Pulse: 61   Resp:    Temp:    SpO2: 99%     Post vital signs: stable  Level of consciousness: awake and responds to simple questions  Post-anesthesia pain: pain controlled  Post-anesthesia nausea and vomiting: no  Pulmonary: unassisted, return to baseline  Cardiovascular: stable and blood pressure at baseline  Hydration: adequate  Anesthetic events: no    QCDR Measures:  ASA# 11 - Katie-op Cardiac Arrest: ASA11B - Patient did NOT experience unanticipated cardiac arrest  ASA# 12 - Katie-op Mortality Rate: ASA12B - Patient did NOT die  ASA# 13 - PACU Re-Intubation Rate: NA - No ETT / LMA used for case  ASA# 10 - Composite Anes Safety: ASA10A - No serious adverse event    Additional Notes:

## 2021-06-14 NOTE — CONSULTS
[facility] Hematology and Oncology Consult Note    Patient: Mi Paul  MRN: [id number]  Date of Service: 10/31/2017      Reason for Visit:    1.  Bladder cancer    Assessment/Plan:    1.  Bladder cancer: The patient's pathology report was reviewed.  She has muscle invasive disease.  Reviewed the implications of this with the patient.  I discussed the surgery directly with Dr. Vasquez.  He described about a 2 cm area of tumor.  I also discussed the case with Dr. Lee.  Reviewed with the patient that I think she should have neoadjuvant chemotherapy.  Survival benefit has been demonstrated and T2 tumors.  No survival benefit has definitively been demonstrated with adjuvant therapy.  This recommendation would be in keeping with a category 1 recommendation from the NCCN.  Typically we would use cisplatin and gemcitabine.  Reviewed schedule of this treatment.  Would usually do 3 cycles.  We talked about some of the more common side effects.  Currently, she does have a surgery date scheduled.  She would like to take some time to think about how she would like to proceed as her surgeon is recommending upfront surgery.  Her case will be presented at the next urology tumor conference.  Will call her afterwards with recommendation of the group.  Her questions were answered.  We will also get a CT scan of the chest to complete staging.    ECOG Performance   ECOG Performance Status: 0    Distress Assessment  Distress Assessment Score: 4 (today's visit)    Problem List:    1. Malignant neoplasm of urinary bladder neck  CT Chest With Contrast    HM1(CBC and Differential)    Comprehensive Metabolic Panel   2. Malignant neoplasm of overlapping sites of bladder       Staging History:    Malignant neoplasm of overlapping sites of bladder    Staging form: Urinary Bladder, AJCC 7th Edition    - Clinical stage from 12/6/2017: Stage II (T2, N0, M0) - Signed by Navarro eRdmond MD on 12/6/2017    History:    Mi is a 77-year-old  woman who is referred for evaluation of a newly diagnosed bladder cancer.  The patient developed some hematuria in late August 2017.  She saw her primary care provider.  She had a CT scan performed on October 3.  This showed no evidence of kidney stones.  No urinary tract mass lesion or abnormality in the abdomen or pelvis.  She was seen in the urology clinic.  She had a flexible cystoscopy performed.  This showed a bladder tumor of the left anterior left lateral and around the left bladder neck.  She was then taken to the operating room on October 11 for a transurethral resection of bladder tumor.  Pathology from this procedure showed invasive poorly differentiated urothelial carcinoma.  She was then referred to a second neurologist for consideration of cystectomy.  This was recommended upfront.  The patient has a friend who is undergoing neoadjuvant chemotherapy for bladder cancer and requested an oncology consult.  She is here today to discuss this problem.  Her hematuria has resolved.  She is not having any shortness of breath or cough.  No abdominal pain.    Past History:    Past Medical History:   Diagnosis Date     Diastolic dysfunction      Hypertension      Hypothyroidism      Osteoarthritis of multiple joints      Paroxysmal atrial fibrillation      SBO (small bowel obstruction)     Family History   Problem Relation Age of Onset     Breast cancer Mother 60     Stroke Mother      Throat cancer Father      Coronary artery disease Father      Hypothyroidism Sister      Prostate cancer Brother      Prostate cancer Paternal Uncle 60     metastatic to bone     Multiple myeloma Cousin 50     Multiple myeloma Cousin 50     Chip Jhaveri Hx       [unfilled] Social History     Social History     Marital status:      Spouse name: N/A     Number of children: 3     Years of education: N/A     Occupational History     Not on file.     Social History Main Topics     Smoking status: Former Smoker     Quit  "date: 1967     Smokeless tobacco: Never Used     Alcohol use 1.8 - 2.4 oz/week     3 - 4 Glasses of wine per week     Drug use: No     Sexual activity: No     Other Topics Concern     Not on file     Social History Narrative    Presented to the ED with her son 03/13/17            Allergies:    No Known Allergies    Review of Systems:    General  General (WDL): All general elements are within defined limits  ENT  ENT (WDL): Exceptions to WDL  Glasses or Contacts: Yes - Chronic (Greater than 3 months)  Hearing loss: Yes - Chronic (Greater than 3 months)  Hearing Aids: Yes - Chronic (Greater than 3 months)  Respiratory  Respiratory (WDL): All respiratory elements are within defined limits  Cardiovascular  Cardiovascular (WDL): Exceptions to WDL (arial tachycardia)  Endocrine  Endocrine (WDL): All endocrine elements are within defined limits  Gastrointestinal  Gastrointestinal (WDL): Exceptions to WDL  Hemorrhoids: Yes - Chronic (Greater than 3 months)  Musculoskeletal  Musculoskeletal (WDL): All musculoskeletal elements are within defined limits  Neurological  Neurological (WDL): All neurological elements are within defined limits  Dominant Hand: Right  Psychological/Emotional  Psychological/Emotional (WDL): Exceptions to WDL  Insomnia: Yes - Recent (Less than 3 months)  Anxiety: Yes - Recent (Less than 3 months)  Hematological/Lymphatic  Hematological/Lymphatic (WDL): All hematological/lymphatic elements are within defined limits  Dermatological  Dermatologic (WDL): All dermatological elements are within defined limits  Genitourinary/Reproductive  Genitourinary/Reproductive (WDL): Exceptions to WDL  Urinary Frequency: Yes - Recent (Less than 3 months)  Urination more than 2 times a night: Yes - Recent (Less than 3 months)  Blood in urine: Yes - Recent (Less than 3 months)  Reproductive (Females only)     Pain  Currently in Pain: No/denies    Physical Exam:    Recent Vitals 10/31/2017   Height 5' 6.75\"   Weight 168 lbs " 13 oz   BSA (m2) 1.9 m2   /79   Pulse 64   Temp 97.9   Temp src 1   SpO2 98   Some recent data might be hidden     General: patient appears stated age of 77 y.o.. Nontoxic and in no distress.   HEENT: Head: atraumatic, normocephalic. Sclerae anicteric.  Chest:  Normal respiratory effort  Cardiac:  No edema.  Rate and rhythm.  No murmur.  Abdomen: abdomen is soft, non-distended  Extremities: normal tone and muscle bulk.   Skin: no lesions or rash. Warm and dry.   CNS: alert and oriented x3. Grossly non-focal.   Psychiatric: normal mood and affect.     Lab Results:    Results for JENNIFER LOYA (MRN [id number]) as of 12/6/2017 19:55   Ref. Range 10/31/2017 12:30   Sodium Latest Ref Range: 136 - 145 mmol/L 141   Potassium Latest Ref Range: 3.5 - 5.0 mmol/L 4.9   Chloride Latest Ref Range: 98 - 107 mmol/L 104   CO2 Latest Ref Range: 22 - 31 mmol/L 29   Anion Gap, Calculation Latest Ref Range: 5 - 18 mmol/L 8   BUN Latest Ref Range: 8 - 28 mg/dL 17   Creatinine Latest Ref Range: 0.60 - 1.10 mg/dL 0.83   GFR MDRD Af Amer Latest Ref Range: >60 mL/min/1.73m2 >60   GFR MDRD Non Af Amer Latest Ref Range: >60 mL/min/1.73m2 >60   Calcium Latest Ref Range: 8.5 - 10.5 mg/dL 9.7   AST Latest Ref Range: 0 - 40 U/L 17   ALT Latest Ref Range: 0 - 45 U/L 12   ALBUMIN Latest Ref Range: 3.5 - 5.0 g/dL 3.7   Protein, Total Latest Ref Range: 6.0 - 8.0 g/dL 7.7   Alkaline Phosphatase Latest Ref Range: 45 - 120 U/L 102   Bilirubin, Total Latest Ref Range: 0.0 - 1.0 mg/dL 0.5   Glucose Latest Ref Range: 70 - 125 mg/dL 116   WBC Latest Ref Range: 4.0 - 11.0 thou/uL 7.1   RBC Latest Ref Range: 3.80 - 5.40 mill/uL 4.72   Hemoglobin Latest Ref Range: 12.0 - 16.0 g/dL 14.3   Hematocrit Latest Ref Range: 35.0 - 47.0 % 43.0   MCV Latest Ref Range: 80 - 100 fL 91   MCH Latest Ref Range: 27.0 - 34.0 pg 30.3   MCHC Latest Ref Range: 32.0 - 36.0 g/dL 33.3   RDW Latest Ref Range: 11.0 - 14.5 % 13.3   Platelets Latest Ref Range: 140 - 440 thou/uL  342   MPV Latest Ref Range: 8.5 - 12.5 fL 10.9   Neutrophils % Latest Ref Range: 50 - 70 % 65   Lymphocytes % Latest Ref Range: 20 - 40 % 27   Monocytes % Latest Ref Range: 2 - 10 % 7   Eosinophils % Latest Ref Range: 0 - 6 % 1   Basophils % Latest Ref Range: 0 - 2 % 1   Neutrophils Absolute Latest Ref Range: 2.0 - 7.7 thou/uL 4.6   Lymphocytes Absolute Latest Ref Range: 0.8 - 4.4 thou/uL 1.9   Monocytes Absolute Latest Ref Range: 0.0 - 0.9 thou/uL 0.5   Eosinophils Absolute Latest Ref Range: 0.0 - 0.4 thou/uL 0.1   Basophils Absolute Latest Ref Range: 0.0 - 0.2 thou/uL 0.1     No results found for this or any previous visit (from the past 168 hour(s)).    Imaging Results:    I reviewed the CAT scan reports available in the medical record.  Imaging was not available for review.    Pathology:    Final Diagnosis   BLADDER WALL, LEFT ANTERIOR AND LATERAL, TRANSURETHRAL RESECTION:      1)  INVASIVE, POORLY-DIFFERENTIATED UROTHELIAL CARCINOMA      2)  PLEASE SEE COMMENT AND SYNOPTIC REPORT BELOW       MCSS   Electronically signed by Bala Schaefer MD on 10/12/2017 at 1525   Comment  Immunohistochemical stains are performed on paraffin sections of bladder tumor for further characterization. A stain for cytokeratin-7 is positive. A stain for cytokeratin-20 is negative.  A stain for RACHEL-3 is positive.  This profile is consistent with urothelial carcinoma.           Signed by: Navarro Redmond MD

## 2021-06-15 PROBLEM — K56.609 SMALL BOWEL OBSTRUCTION (H): Status: ACTIVE | Noted: 2017-03-13

## 2021-06-16 PROBLEM — E13.10: Status: ACTIVE | Noted: 2019-10-24

## 2021-06-16 PROBLEM — K56.609 SBO (SMALL BOWEL OBSTRUCTION) (H): Status: ACTIVE | Noted: 2019-12-25

## 2021-06-16 PROBLEM — E83.42 HYPOMAGNESEMIA: Status: ACTIVE | Noted: 2019-12-26

## 2021-06-16 PROBLEM — C67.8 MALIGNANT NEOPLASM OF OVERLAPPING SITES OF BLADDER (H): Status: ACTIVE | Noted: 2017-12-06

## 2021-07-03 NOTE — ANESTHESIA PREPROCEDURE EVALUATION
Anesthesia Preprocedure Evaluation by Vanessa Blum MD at 10/11/2017  6:53 AM     Author: Vanessa Blum MD Service: -- Author Type: Physician    Filed: 10/11/2017  7:13 AM Date of Service: 10/11/2017  6:53 AM Status: Addendum    : Vanessa Blum MD (Physician)    Related Notes: Original Note by Vanessa Blum MD (Physician) filed at 10/11/2017  7:03 AM       Anesthesia Evaluation      Patient summary reviewed   History of anesthetic complications     Airway   Mallampati: II  Neck ROM: full   Pulmonary - normal exam    breath sounds clear to auscultation  (+) a smoker  (-) shortness of breath, sleep apnea                         Cardiovascular   Exercise tolerance: > or = 10 METS (Bikes 20 miles)  (+) hypertension, dysrhythmias, angina, ,     (-) murmur  ECG reviewed  Rhythm: regular  Rate: normal,    no murmur   ROS comment:   Left Ventricle: Normal size and systolic function.The calculated left ventricular ejection fraction is 68%.    Grade I (mild) left ventricular diastolic dysfunction consistent with impaired relaxation. E/e' > 15, suggesting elevated LV filling pressures.    Right Ventricle: Normal size and systolic function. TAPSE is normal.    Left Atrium: Left atrial volume is mildly increased    Aortic Valve: There is mild focal calcification without reduced excursion of the aortic valve present. No stenosis. Mild aortic regurgitation with toward ventricular septum.    Tricuspid Valve: Trace tricuspid valve regurgitation. The estimated systolic pulmonary artery pressure is 39 mmhg.    No previous study for comparison.     Neuro/Psych - negative ROS     Endo/Other    (+) hypothyroidism, arthritis,   (-) no obesity, not pregnant     GI/Hepatic/Renal    (-) GERD          Dental                             Anesthesia Plan  Planned anesthetic: general LMA    ASA 2   Induction: intravenous   Anesthetic plan and risks discussed with: patient and spouse  Anesthesia plan  special considerations: antiemetics,   Post-op plan: routine recovery

## 2021-07-13 ENCOUNTER — RECORDS - HEALTHEAST (OUTPATIENT)
Dept: ADMINISTRATIVE | Facility: CLINIC | Age: 81
End: 2021-07-13

## 2021-10-10 ENCOUNTER — HEALTH MAINTENANCE LETTER (OUTPATIENT)
Age: 81
End: 2021-10-10

## 2021-12-04 ENCOUNTER — HEALTH MAINTENANCE LETTER (OUTPATIENT)
Age: 81
End: 2021-12-04

## 2022-02-17 PROBLEM — E11.10 DKA (DIABETIC KETOACIDOSIS) (H): Status: ACTIVE | Noted: 2019-10-24

## 2022-03-08 ENCOUNTER — LAB REQUISITION (OUTPATIENT)
Dept: LAB | Facility: CLINIC | Age: 82
End: 2022-03-08

## 2022-03-08 DIAGNOSIS — E03.9 HYPOTHYROIDISM, UNSPECIFIED: ICD-10-CM

## 2022-03-08 DIAGNOSIS — E78.2 MIXED HYPERLIPIDEMIA: ICD-10-CM

## 2022-03-08 DIAGNOSIS — E11.9 TYPE 2 DIABETES MELLITUS WITHOUT COMPLICATIONS (H): ICD-10-CM

## 2022-03-08 DIAGNOSIS — E83.42 HYPOMAGNESEMIA: ICD-10-CM

## 2022-03-08 LAB
ALBUMIN SERPL-MCNC: 3.7 G/DL (ref 3.5–5)
ALP SERPL-CCNC: 94 U/L (ref 45–120)
ALT SERPL W P-5'-P-CCNC: 14 U/L (ref 0–45)
ANION GAP SERPL CALCULATED.3IONS-SCNC: 8 MMOL/L (ref 5–18)
AST SERPL W P-5'-P-CCNC: 20 U/L (ref 0–40)
BILIRUB SERPL-MCNC: 0.5 MG/DL (ref 0–1)
BUN SERPL-MCNC: 25 MG/DL (ref 8–28)
CALCIUM SERPL-MCNC: 9.7 MG/DL (ref 8.5–10.5)
CHLORIDE BLD-SCNC: 101 MMOL/L (ref 98–107)
CHOLEST SERPL-MCNC: 195 MG/DL
CO2 SERPL-SCNC: 31 MMOL/L (ref 22–31)
CREAT SERPL-MCNC: 1.2 MG/DL (ref 0.6–1.1)
FASTING STATUS PATIENT QL REPORTED: NORMAL
GFR SERPL CREATININE-BSD FRML MDRD: 45 ML/MIN/1.73M2
GLUCOSE BLD-MCNC: 159 MG/DL (ref 70–125)
HDLC SERPL-MCNC: 62 MG/DL
LDLC SERPL CALC-MCNC: 109 MG/DL
MAGNESIUM SERPL-MCNC: 1.8 MG/DL (ref 1.8–2.6)
POTASSIUM BLD-SCNC: 4.6 MMOL/L (ref 3.5–5)
PROT SERPL-MCNC: 7 G/DL (ref 6–8)
SODIUM SERPL-SCNC: 140 MMOL/L (ref 136–145)
TRIGL SERPL-MCNC: 121 MG/DL
TSH SERPL DL<=0.005 MIU/L-ACNC: 0.23 UIU/ML (ref 0.3–5)

## 2022-03-08 PROCEDURE — 84443 ASSAY THYROID STIM HORMONE: CPT | Performed by: FAMILY MEDICINE

## 2022-03-08 PROCEDURE — 80061 LIPID PANEL: CPT | Performed by: FAMILY MEDICINE

## 2022-03-08 PROCEDURE — 83735 ASSAY OF MAGNESIUM: CPT | Performed by: FAMILY MEDICINE

## 2022-03-08 PROCEDURE — 80053 COMPREHEN METABOLIC PANEL: CPT | Performed by: FAMILY MEDICINE

## 2022-04-04 ENCOUNTER — LAB REQUISITION (OUTPATIENT)
Dept: LAB | Facility: CLINIC | Age: 82
End: 2022-04-04

## 2022-04-04 LAB — TSH SERPL DL<=0.005 MIU/L-ACNC: 0.89 UIU/ML (ref 0.3–5)

## 2022-04-04 PROCEDURE — 84443 ASSAY THYROID STIM HORMONE: CPT | Performed by: FAMILY MEDICINE

## 2022-05-21 ENCOUNTER — HEALTH MAINTENANCE LETTER (OUTPATIENT)
Age: 82
End: 2022-05-21

## 2022-07-16 ENCOUNTER — HEALTH MAINTENANCE LETTER (OUTPATIENT)
Age: 82
End: 2022-07-16

## 2022-09-18 ENCOUNTER — HEALTH MAINTENANCE LETTER (OUTPATIENT)
Age: 82
End: 2022-09-18

## 2023-01-28 ENCOUNTER — HEALTH MAINTENANCE LETTER (OUTPATIENT)
Age: 83
End: 2023-01-28

## 2023-06-04 ENCOUNTER — HEALTH MAINTENANCE LETTER (OUTPATIENT)
Age: 83
End: 2023-06-04

## 2023-07-30 ENCOUNTER — HEALTH MAINTENANCE LETTER (OUTPATIENT)
Age: 83
End: 2023-07-30

## 2023-10-21 ENCOUNTER — HOSPITAL ENCOUNTER (INPATIENT)
Facility: HOSPITAL | Age: 83
LOS: 3 days | Discharge: HOME | DRG: 389 | End: 2023-10-25
Attending: EMERGENCY MEDICINE | Admitting: INTERNAL MEDICINE
Payer: COMMERCIAL

## 2023-10-21 DIAGNOSIS — R10.84 GENERALIZED ABDOMINAL PAIN: ICD-10-CM

## 2023-10-21 DIAGNOSIS — K56.600 PARTIAL SMALL BOWEL OBSTRUCTION (H): ICD-10-CM

## 2023-10-21 DIAGNOSIS — Z43.6 ATTENTION TO UROSTOMY (H): Primary | ICD-10-CM

## 2023-10-21 LAB
ERYTHROCYTE [DISTWIDTH] IN BLOOD BY AUTOMATED COUNT: 13.2 % (ref 10–15)
HCT VFR BLD AUTO: 39.9 % (ref 35–47)
HGB BLD-MCNC: 13.5 G/DL (ref 11.7–15.7)
LACTATE SERPL-SCNC: 0.8 MMOL/L (ref 0.7–2)
MCH RBC QN AUTO: 31.4 PG (ref 26.5–33)
MCHC RBC AUTO-ENTMCNC: 33.8 G/DL (ref 31.5–36.5)
MCV RBC AUTO: 93 FL (ref 78–100)
PLATELET # BLD AUTO: 244 10E3/UL (ref 150–450)
RBC # BLD AUTO: 4.3 10E6/UL (ref 3.8–5.2)
WBC # BLD AUTO: 8.4 10E3/UL (ref 4–11)

## 2023-10-21 PROCEDURE — 85027 COMPLETE CBC AUTOMATED: CPT | Performed by: EMERGENCY MEDICINE

## 2023-10-21 PROCEDURE — 80053 COMPREHEN METABOLIC PANEL: CPT | Performed by: EMERGENCY MEDICINE

## 2023-10-21 PROCEDURE — 96374 THER/PROPH/DIAG INJ IV PUSH: CPT | Mod: 59

## 2023-10-21 PROCEDURE — 250N000011 HC RX IP 250 OP 636: Mod: JZ | Performed by: EMERGENCY MEDICINE

## 2023-10-21 PROCEDURE — 99285 EMERGENCY DEPT VISIT HI MDM: CPT | Mod: 25

## 2023-10-21 PROCEDURE — 36415 COLL VENOUS BLD VENIPUNCTURE: CPT | Performed by: EMERGENCY MEDICINE

## 2023-10-21 PROCEDURE — 83605 ASSAY OF LACTIC ACID: CPT | Performed by: EMERGENCY MEDICINE

## 2023-10-21 PROCEDURE — 83690 ASSAY OF LIPASE: CPT | Performed by: EMERGENCY MEDICINE

## 2023-10-21 RX ORDER — ONDANSETRON 2 MG/ML
4 INJECTION INTRAMUSCULAR; INTRAVENOUS ONCE
Status: COMPLETED | OUTPATIENT
Start: 2023-10-21 | End: 2023-10-22

## 2023-10-21 RX ORDER — MORPHINE SULFATE 2 MG/ML
2 INJECTION, SOLUTION INTRAMUSCULAR; INTRAVENOUS ONCE
Status: COMPLETED | OUTPATIENT
Start: 2023-10-21 | End: 2023-10-22

## 2023-10-21 RX ADMIN — ONDANSETRON 4 MG: 2 INJECTION INTRAMUSCULAR; INTRAVENOUS at 23:59

## 2023-10-21 ASSESSMENT — ACTIVITIES OF DAILY LIVING (ADL): ADLS_ACUITY_SCORE: 33

## 2023-10-22 ENCOUNTER — APPOINTMENT (OUTPATIENT)
Dept: CT IMAGING | Facility: HOSPITAL | Age: 83
DRG: 389 | End: 2023-10-22
Attending: EMERGENCY MEDICINE
Payer: COMMERCIAL

## 2023-10-22 ENCOUNTER — APPOINTMENT (OUTPATIENT)
Dept: RADIOLOGY | Facility: HOSPITAL | Age: 83
DRG: 389 | End: 2023-10-22
Attending: SURGERY
Payer: COMMERCIAL

## 2023-10-22 PROBLEM — K56.609 BOWEL OBSTRUCTION (H): Status: ACTIVE | Noted: 2023-10-22

## 2023-10-22 PROBLEM — N13.30 HYDROURETERONEPHROSIS: Status: ACTIVE | Noted: 2023-10-22

## 2023-10-22 PROBLEM — Z79.4 TYPE 2 DIABETES MELLITUS WITHOUT COMPLICATION, WITH LONG-TERM CURRENT USE OF INSULIN (H): Status: ACTIVE | Noted: 2023-10-22

## 2023-10-22 PROBLEM — C67.9 BLADDER CANCER (H): Status: ACTIVE | Noted: 2023-10-22

## 2023-10-22 PROBLEM — E11.9 TYPE 2 DIABETES MELLITUS WITHOUT COMPLICATION, WITH LONG-TERM CURRENT USE OF INSULIN (H): Status: ACTIVE | Noted: 2023-10-22

## 2023-10-22 PROBLEM — Z86.2 HISTORY OF SARCOIDOSIS: Status: ACTIVE | Noted: 2020-10-27

## 2023-10-22 PROBLEM — R10.84 GENERALIZED ABDOMINAL PAIN: Status: ACTIVE | Noted: 2023-10-22

## 2023-10-22 PROBLEM — Z85.51 H/O PRIMARY MALIGNANT NEOPLASM OF URINARY BLADDER: Status: ACTIVE | Noted: 2023-10-22

## 2023-10-22 PROBLEM — K56.600 PARTIAL SMALL BOWEL OBSTRUCTION (H): Status: ACTIVE | Noted: 2023-10-22

## 2023-10-22 LAB
ALBUMIN SERPL BCG-MCNC: 4 G/DL (ref 3.5–5.2)
ALBUMIN UR-MCNC: 20 MG/DL
ALP SERPL-CCNC: 80 U/L (ref 35–104)
ALT SERPL W P-5'-P-CCNC: 15 U/L (ref 0–50)
ANION GAP SERPL CALCULATED.3IONS-SCNC: 11 MMOL/L (ref 7–15)
ANION GAP SERPL CALCULATED.3IONS-SCNC: 9 MMOL/L (ref 7–15)
APPEARANCE UR: ABNORMAL
AST SERPL W P-5'-P-CCNC: 23 U/L (ref 0–45)
BACTERIA #/AREA URNS HPF: ABNORMAL /HPF
BILIRUB SERPL-MCNC: 0.4 MG/DL
BILIRUB UR QL STRIP: NEGATIVE
BUN SERPL-MCNC: 23.7 MG/DL (ref 8–23)
BUN SERPL-MCNC: 25.5 MG/DL (ref 8–23)
CALCIUM SERPL-MCNC: 9 MG/DL (ref 8.8–10.2)
CALCIUM SERPL-MCNC: 9.8 MG/DL (ref 8.8–10.2)
CHLORIDE SERPL-SCNC: 101 MMOL/L (ref 98–107)
CHLORIDE SERPL-SCNC: 106 MMOL/L (ref 98–107)
COLOR UR AUTO: ABNORMAL
CREAT SERPL-MCNC: 1.07 MG/DL (ref 0.51–0.95)
CREAT SERPL-MCNC: 1.2 MG/DL (ref 0.51–0.95)
DEPRECATED HCO3 PLAS-SCNC: 24 MMOL/L (ref 22–29)
DEPRECATED HCO3 PLAS-SCNC: 29 MMOL/L (ref 22–29)
EGFRCR SERPLBLD CKD-EPI 2021: 45 ML/MIN/1.73M2
EGFRCR SERPLBLD CKD-EPI 2021: 51 ML/MIN/1.73M2
GLUCOSE BLDC GLUCOMTR-MCNC: 262 MG/DL (ref 70–99)
GLUCOSE BLDC GLUCOMTR-MCNC: 307 MG/DL (ref 70–99)
GLUCOSE BLDC GLUCOMTR-MCNC: 381 MG/DL (ref 70–99)
GLUCOSE SERPL-MCNC: 109 MG/DL (ref 70–99)
GLUCOSE SERPL-MCNC: 340 MG/DL (ref 70–99)
GLUCOSE UR STRIP-MCNC: 70 MG/DL
HBA1C MFR BLD: 7.4 %
HGB UR QL STRIP: NEGATIVE
KETONES UR STRIP-MCNC: 20 MG/DL
LEUKOCYTE ESTERASE UR QL STRIP: NEGATIVE
LIPASE SERPL-CCNC: 39 U/L (ref 13–60)
MUCOUS THREADS #/AREA URNS LPF: PRESENT /LPF
NITRATE UR QL: POSITIVE
PH UR STRIP: 7.5 [PH] (ref 5–7)
POTASSIUM SERPL-SCNC: 3.9 MMOL/L (ref 3.4–5.3)
POTASSIUM SERPL-SCNC: 4.4 MMOL/L (ref 3.4–5.3)
PROT SERPL-MCNC: 6.9 G/DL (ref 6.4–8.3)
RBC URINE: 0 /HPF
SODIUM SERPL-SCNC: 139 MMOL/L (ref 135–145)
SODIUM SERPL-SCNC: 141 MMOL/L (ref 135–145)
SP GR UR STRIP: 1.03 (ref 1–1.03)
SQUAMOUS EPITHELIAL: <1 /HPF
TRI-PHOS CRY #/AREA URNS HPF: ABNORMAL /HPF
UROBILINOGEN UR STRIP-MCNC: <2 MG/DL
WBC URINE: 2 /HPF

## 2023-10-22 PROCEDURE — 74018 RADEX ABDOMEN 1 VIEW: CPT

## 2023-10-22 PROCEDURE — 83036 HEMOGLOBIN GLYCOSYLATED A1C: CPT | Performed by: INTERNAL MEDICINE

## 2023-10-22 PROCEDURE — 82962 GLUCOSE BLOOD TEST: CPT

## 2023-10-22 PROCEDURE — 74177 CT ABD & PELVIS W/CONTRAST: CPT

## 2023-10-22 PROCEDURE — 87086 URINE CULTURE/COLONY COUNT: CPT | Performed by: PHYSICIAN ASSISTANT

## 2023-10-22 PROCEDURE — 250N000012 HC RX MED GY IP 250 OP 636 PS 637: Performed by: INTERNAL MEDICINE

## 2023-10-22 PROCEDURE — 99222 1ST HOSP IP/OBS MODERATE 55: CPT | Performed by: SURGERY

## 2023-10-22 PROCEDURE — 250N000013 HC RX MED GY IP 250 OP 250 PS 637: Performed by: INTERNAL MEDICINE

## 2023-10-22 PROCEDURE — 250N000011 HC RX IP 250 OP 636: Performed by: EMERGENCY MEDICINE

## 2023-10-22 PROCEDURE — 81001 URINALYSIS AUTO W/SCOPE: CPT | Performed by: PHYSICIAN ASSISTANT

## 2023-10-22 PROCEDURE — 96376 TX/PRO/DX INJ SAME DRUG ADON: CPT

## 2023-10-22 PROCEDURE — 99418 PROLNG IP/OBS E/M EA 15 MIN: CPT | Performed by: INTERNAL MEDICINE

## 2023-10-22 PROCEDURE — 80048 BASIC METABOLIC PNL TOTAL CA: CPT | Performed by: INTERNAL MEDICINE

## 2023-10-22 PROCEDURE — 96361 HYDRATE IV INFUSION ADD-ON: CPT

## 2023-10-22 PROCEDURE — 250N000013 HC RX MED GY IP 250 OP 250 PS 637: Performed by: SURGERY

## 2023-10-22 PROCEDURE — 96375 TX/PRO/DX INJ NEW DRUG ADDON: CPT

## 2023-10-22 PROCEDURE — 258N000003 HC RX IP 258 OP 636: Performed by: EMERGENCY MEDICINE

## 2023-10-22 PROCEDURE — 258N000003 HC RX IP 258 OP 636: Performed by: INTERNAL MEDICINE

## 2023-10-22 PROCEDURE — 120N000001 HC R&B MED SURG/OB

## 2023-10-22 PROCEDURE — 250N000011 HC RX IP 250 OP 636: Performed by: INTERNAL MEDICINE

## 2023-10-22 PROCEDURE — 99223 1ST HOSP IP/OBS HIGH 75: CPT | Mod: AI | Performed by: INTERNAL MEDICINE

## 2023-10-22 RX ORDER — MORPHINE SULFATE 4 MG/ML
4 INJECTION, SOLUTION INTRAMUSCULAR; INTRAVENOUS ONCE
Status: COMPLETED | OUTPATIENT
Start: 2023-10-22 | End: 2023-10-22

## 2023-10-22 RX ORDER — EZETIMIBE 10 MG/1
10 TABLET ORAL EVERY MORNING
COMMUNITY
Start: 2023-09-15

## 2023-10-22 RX ORDER — UREA 10 %
LOTION (ML) TOPICAL EVERY MORNING
COMMUNITY

## 2023-10-22 RX ORDER — ATENOLOL 25 MG/1
12.5 TABLET ORAL DAILY
COMMUNITY
Start: 2023-10-12

## 2023-10-22 RX ORDER — DOCUSATE SODIUM 100 MG/1
100 CAPSULE, LIQUID FILLED ORAL 2 TIMES DAILY PRN
Status: DISCONTINUED | OUTPATIENT
Start: 2023-10-22 | End: 2023-10-25 | Stop reason: HOSPADM

## 2023-10-22 RX ORDER — NALOXONE HYDROCHLORIDE 0.4 MG/ML
0.2 INJECTION, SOLUTION INTRAMUSCULAR; INTRAVENOUS; SUBCUTANEOUS
Status: DISCONTINUED | OUTPATIENT
Start: 2023-10-22 | End: 2023-10-25 | Stop reason: HOSPADM

## 2023-10-22 RX ORDER — ASPIRIN 81 MG/1
81 TABLET ORAL DAILY
Status: DISCONTINUED | OUTPATIENT
Start: 2023-10-23 | End: 2023-10-25 | Stop reason: HOSPADM

## 2023-10-22 RX ORDER — EZETIMIBE 10 MG/1
10 TABLET ORAL EVERY MORNING
Status: DISCONTINUED | OUTPATIENT
Start: 2023-10-23 | End: 2023-10-25 | Stop reason: HOSPADM

## 2023-10-22 RX ORDER — ASPIRIN 81 MG/1
81 TABLET ORAL AT BEDTIME
Status: DISCONTINUED | OUTPATIENT
Start: 2023-10-23 | End: 2023-10-22

## 2023-10-22 RX ORDER — ATENOLOL 25 MG/1
12.5 TABLET ORAL DAILY
Status: DISCONTINUED | OUTPATIENT
Start: 2023-10-22 | End: 2023-10-25 | Stop reason: HOSPADM

## 2023-10-22 RX ORDER — UREA 10 %
500 LOTION (ML) TOPICAL 2 TIMES DAILY
Status: DISCONTINUED | OUTPATIENT
Start: 2023-10-22 | End: 2023-10-25 | Stop reason: HOSPADM

## 2023-10-22 RX ORDER — ASPIRIN 81 MG/1
81 TABLET ORAL AT BEDTIME
Status: DISCONTINUED | OUTPATIENT
Start: 2023-10-22 | End: 2023-10-22

## 2023-10-22 RX ORDER — NALOXONE HYDROCHLORIDE 0.4 MG/ML
0.4 INJECTION, SOLUTION INTRAMUSCULAR; INTRAVENOUS; SUBCUTANEOUS
Status: DISCONTINUED | OUTPATIENT
Start: 2023-10-22 | End: 2023-10-25 | Stop reason: HOSPADM

## 2023-10-22 RX ORDER — INSULIN GLARGINE 100 [IU]/ML
12 INJECTION, SOLUTION SUBCUTANEOUS AT BEDTIME
COMMUNITY
Start: 2023-09-29

## 2023-10-22 RX ORDER — DEXTROSE MONOHYDRATE 25 G/50ML
25-50 INJECTION, SOLUTION INTRAVENOUS
Status: DISCONTINUED | OUTPATIENT
Start: 2023-10-22 | End: 2023-10-25 | Stop reason: HOSPADM

## 2023-10-22 RX ORDER — FAMOTIDINE 20 MG/1
20 TABLET, FILM COATED ORAL DAILY
Status: DISCONTINUED | OUTPATIENT
Start: 2023-10-23 | End: 2023-10-25 | Stop reason: HOSPADM

## 2023-10-22 RX ORDER — ASPIRIN 81 MG/1
81 TABLET ORAL AT BEDTIME
COMMUNITY

## 2023-10-22 RX ORDER — INSULIN ASPART 100 [IU]/ML
INJECTION, SOLUTION INTRAVENOUS; SUBCUTANEOUS
COMMUNITY
Start: 2023-10-13

## 2023-10-22 RX ORDER — POLYETHYLENE GLYCOL 3350 17 G/17G
17 POWDER, FOR SOLUTION ORAL DAILY PRN
Status: DISCONTINUED | OUTPATIENT
Start: 2023-10-22 | End: 2023-10-25 | Stop reason: HOSPADM

## 2023-10-22 RX ORDER — SODIUM CHLORIDE 9 MG/ML
INJECTION, SOLUTION INTRAVENOUS CONTINUOUS
Status: DISCONTINUED | OUTPATIENT
Start: 2023-10-22 | End: 2023-10-25 | Stop reason: HOSPADM

## 2023-10-22 RX ORDER — LEVOTHYROXINE SODIUM 112 UG/1
112 TABLET ORAL
Status: DISCONTINUED | OUTPATIENT
Start: 2023-10-23 | End: 2023-10-25 | Stop reason: HOSPADM

## 2023-10-22 RX ORDER — LEVOTHYROXINE SODIUM 112 UG/1
112 TABLET ORAL DAILY
COMMUNITY

## 2023-10-22 RX ORDER — IOPAMIDOL 755 MG/ML
75 INJECTION, SOLUTION INTRAVASCULAR ONCE
Status: COMPLETED | OUTPATIENT
Start: 2023-10-22 | End: 2023-10-22

## 2023-10-22 RX ORDER — UREA 10 %
1000 LOTION (ML) TOPICAL EVERY MORNING
Status: DISCONTINUED | OUTPATIENT
Start: 2023-10-23 | End: 2023-10-22

## 2023-10-22 RX ORDER — NICOTINE POLACRILEX 4 MG
15-30 LOZENGE BUCCAL
Status: DISCONTINUED | OUTPATIENT
Start: 2023-10-22 | End: 2023-10-25 | Stop reason: HOSPADM

## 2023-10-22 RX ORDER — BISACODYL 10 MG
10 SUPPOSITORY, RECTAL RECTAL DAILY
Status: DISCONTINUED | OUTPATIENT
Start: 2023-10-22 | End: 2023-10-25 | Stop reason: HOSPADM

## 2023-10-22 RX ADMIN — BISACODYL 10 MG: 10 SUPPOSITORY RECTAL at 09:07

## 2023-10-22 RX ADMIN — BISACODYL 10 MG: 10 SUPPOSITORY RECTAL at 08:17

## 2023-10-22 RX ADMIN — INSULIN ASPART 1 UNITS: 100 INJECTION, SOLUTION INTRAVENOUS; SUBCUTANEOUS at 06:56

## 2023-10-22 RX ADMIN — INSULIN GLARGINE 8 UNITS: 100 INJECTION, SOLUTION SUBCUTANEOUS at 21:32

## 2023-10-22 RX ADMIN — SODIUM CHLORIDE: 9 INJECTION, SOLUTION INTRAVENOUS at 21:44

## 2023-10-22 RX ADMIN — Medication 500 MG: at 21:32

## 2023-10-22 RX ADMIN — SODIUM CHLORIDE 1000 ML: 9 INJECTION, SOLUTION INTRAVENOUS at 00:24

## 2023-10-22 RX ADMIN — SODIUM CHLORIDE: 9 INJECTION, SOLUTION INTRAVENOUS at 08:17

## 2023-10-22 RX ADMIN — ATENOLOL 12.5 MG: 25 TABLET ORAL at 18:32

## 2023-10-22 RX ADMIN — FAMOTIDINE 20 MG: 10 INJECTION, SOLUTION INTRAVENOUS at 08:16

## 2023-10-22 RX ADMIN — HYDROMORPHONE HYDROCHLORIDE 0.3 MG: 1 INJECTION, SOLUTION INTRAMUSCULAR; INTRAVENOUS; SUBCUTANEOUS at 06:31

## 2023-10-22 RX ADMIN — MORPHINE SULFATE 4 MG: 4 INJECTION, SOLUTION INTRAMUSCULAR; INTRAVENOUS at 00:48

## 2023-10-22 RX ADMIN — INSULIN ASPART 3 UNITS: 100 INJECTION, SOLUTION INTRAVENOUS; SUBCUTANEOUS at 11:48

## 2023-10-22 RX ADMIN — INSULIN ASPART 2 UNITS: 100 INJECTION, SOLUTION INTRAVENOUS; SUBCUTANEOUS at 16:51

## 2023-10-22 RX ADMIN — MORPHINE SULFATE 2 MG: 2 INJECTION, SOLUTION INTRAMUSCULAR; INTRAVENOUS at 00:03

## 2023-10-22 RX ADMIN — DIATRIZOATE MEGLUMINE AND DIATRIZOATE SODIUM 120 ML: 660; 100 SOLUTION ORAL; RECTAL at 08:16

## 2023-10-22 RX ADMIN — IOPAMIDOL 75 ML: 755 INJECTION, SOLUTION INTRAVENOUS at 01:27

## 2023-10-22 ASSESSMENT — ACTIVITIES OF DAILY LIVING (ADL)
ADLS_ACUITY_SCORE: 22
ADLS_ACUITY_SCORE: 35
ADLS_ACUITY_SCORE: 22
ADLS_ACUITY_SCORE: 35
ADLS_ACUITY_SCORE: 35
DEPENDENT_IADLS:: INDEPENDENT
ADLS_ACUITY_SCORE: 22
ADLS_ACUITY_SCORE: 35
ADLS_ACUITY_SCORE: 22
ADLS_ACUITY_SCORE: 35
ADLS_ACUITY_SCORE: 22

## 2023-10-22 NOTE — CONSULTS
Care Management Initial Consult    General Information  Assessment completed with: Patient,    Type of CM/SW Visit: Initial Assessment    Primary Care Provider verified and updated as needed: Yes   Readmission within the last 30 days:        Reason for Consult: discharge planning  Advance Care Planning: Advance Care Planning Reviewed: no concerns identified          Communication Assessment  Patient's communication style: spoken language (English or Bilingual)             Cognitive  Cognitive/Neuro/Behavioral: WDL                      Living Environment:   People in home: alone     Current living Arrangements: house      Able to return to prior arrangements: yes       Family/Social Support:  Care provided by: self  Provides care for: no one  Marital Status:   Children, Sibling(s)          Description of Support System: Supportive, Involved    Support Assessment: Patient communicates needs well met, Adequate family and caregiver support, Adequate social supports    Current Resources:   Patient receiving home care services: No     Community Resources: None  Equipment currently used at home:    Supplies currently used at home: Hearing Aid Batteries    Employment/Financial:  Employment Status: retired        Financial Concerns:             Does the patient's insurance plan have a 3 day qualifying hospital stay waiver?  No    Lifestyle & Psychosocial Needs:  Social Determinants of Health     Food Insecurity: Not on file   Depression: Not on file   Housing Stability: Not on file   Tobacco Use: Not on file   Financial Resource Strain: Not on file   Alcohol Use: Not on file   Transportation Needs: Not on file   Physical Activity: Not on file   Interpersonal Safety: Not on file   Stress: Not on file   Social Connections: Not on file       Functional Status:  Prior to admission patient needed assistance:   Dependent ADLs:: Independent, Ambulation-no assistive device  Dependent IADLs:: Independent       Mental Health  Status:          Chemical Dependency Status:                Values/Beliefs:  Spiritual, Cultural Beliefs, Scientologist Practices, Values that affect care:                 Additional Information:  Lives alone in her home, independent at baseline, no services. Has urostomy which she cares for (hx of bladder ca). Anticipate no CM needs. Son Nii primary contact. Family will transport at discharge.    Salma Carmen RN

## 2023-10-22 NOTE — ED TRIAGE NOTES
The patient has a history of a bowel obstruction. She reports umbilical pain starting about an hour ago. She reports nausea but no vomiting. She reports eating makes it worse.

## 2023-10-22 NOTE — ED PROVIDER NOTES
"EMERGENCY DEPARTMENT ENCOUNTER      NAME: Mi Paul  YOB: 1940  MRN: [id number]    FINAL IMPRESSION  1. Partial small bowel obstruction (H)    2. Generalized abdominal pain        MEDICAL DECISION MAKING   Pertinent Labs & Imaging studies reviewed. (See chart for details)    Mi Paul is a 83 year old female who presents for evaluation of abdominal pain. Records reviewed.  Patient has a history of bladder cancer status post urostomy, SBO x2, type 2 diabetes.  She presents today with complaints of abdominal pain and sensation of \"gassiness.\"  She states that her current symptoms feel similar to what she experienced with past bowel obstructions.  She has had some mild associated nausea but denies emesis.  She did have a bowel movement this morning but not since pain began.  She states that she has not been passing gas this evening either.  She was feeling well earlier in the day today and has no other new concerns.  Vitals on arrival notable for elevated blood pressure but otherwise stable. Remainder of history and exam, as below.     I considered a broad differential including but not limited to gastroenteritis, diverticulitis, hernia, small bowel obstruction/ileus, perforation, AAA, mesenteric ischemia. I have lower suspicion for symptoms secondary to cardiopulmonary or vascular process given history and exam. Also considered ovarian torsion, ovarian cyst, TOA, PID, and pain secondary to vagina/cervical infection but do feel gynecologic/ovarian etiology less likely.  Discussed options for workup with the patient. We agreed on plan for labs, UA, CT A/P and management of symptoms with IV analgesic and antiemetic.     ED Course as of 10/22/23 0353   Sun Oct 22, 2023   0129 Comprehensive metabolic panel(!)  CMP notable for creatinine of 1.20, appears to be at baseline.  No other significant electrolyte derangement, acidosis, or evidence of acute hepatobiliary disease.   0129 Lactic Acid: " 0.8  Lactate within normal limits, less likely end-organ ischemia or systemic infectious process.    0130 CBC (+ platelets, no diff)  CBC reassuring. No evidence of leukocytosis to suggest systemic infectious/inflammatory process. No acute anemia. PLTs wnl.   0130 Lipase: 39  Lipase within normal limits, symptoms less likely related to acute pancreatitis.     0213 CT Abdomen Pelvis w Contrast  CT with likely dilated small bowel loops and evidence of transition point in the right pelvis concerning for early SBO.  I do believe this to be etiology of patient's symptoms.     Work-up today was notable for the above.  CT did show evidence of early small bowel obstruction and I suspect this to be etiology of patient's pain.  She did not have much improvement in discomfort after 2 mg of morphine but after additional 4, had resolution.  She remained hemodynamically stable and blood pressure did improve with analgesic.  Reviewed results as well as recommendations for admission, which she was agreeable to.  Patient has not had any episodes of emesis here and is comfortable at this time so we have agreed on plan to hold on placing an NG tube but if she has any episodes of emesis or worsening distention/pain, will place.  I discussed the case with hospital medicine team who agreed to facilitate admission and we have agreed that we will hold on consulting general surgery emergently, as I suspect they will want to at first attempt conservative management.        Medical Decision Making    History:  Supplemental history from: N/A  External Record(s) reviewed: Documented in chart, if applicable.    Work Up:  Chart documentation includes differential considered and any EKGs or imaging independently interpreted by provider, where specified.  In additional to work up documented, I considered the following work up: Documented in chart, if applicable.    External consultation:  Discussion of management with another provider:  "Hospitalist    Complicating factors:  Care impacted by chronic illness: Cancer/Chemotherapy, Diabetes, Heart Disease, and Hypertension  Care affected by social determinants of health: Access to Medical Care    Disposition considerations: Admit.          ED COURSE  11:08 PM I met with the patient to gather history and to perform my initial exam. We discussed plans for the ED course, including diagnostic testing and treatment.   12:45 AM Rechecked and updated patient. Patient states she is still having pain.  1:28 AM Rechecked and updated patient. Patient reports feeling improved.  1:59 AM Rechecked and updated patient. Patient is comfortable.  3:48 AM Spoke with the Hospitalist, Dr. Witt, who accepts the patient for admission.    MEDICATIONS GIVEN IN THE ED  Medications   morphine (PF) injection 2 mg (2 mg Intravenous $Given 10/22/23 0003)   ondansetron (ZOFRAN) injection 4 mg (4 mg Intravenous $Given 10/21/23 2359)   sodium chloride 0.9% BOLUS 1,000 mL (1,000 mLs Intravenous $New Bag 10/22/23 0024)   morphine (PF) injection 4 mg (4 mg Intravenous $Given 10/22/23 0048)   iopamidol (ISOVUE-370) solution 75 mL (75 mLs Intravenous $Given 10/22/23 0127)       NEW PRESCRIPTIONS STARTED AT TODAY'S VISIT  New Prescriptions    No medications on file          =================================================================    Chief Complaint   Patient presents with    Abdominal Pain         HPI:    Patient information was obtained from: Patient     Use of : N/A     Mi Paul is a 83 year old female with a pertinent medical history of SBO, bladder cancer, T1DM, T2DM, DKA, chronic diastolic CHF, HTN, sarcoidosis, hypothyroidism, who presents to the ED for evaluation of abdominal pain.     Patient reports that she was at baseline and in her normal state of health until a couple of hours ago when she suddenly developed generalized waxing and waning abdominal pain characterized as \"a terrible gas.\" She denies any " flatulence since developing her abdominal pain, and notes she feels as if she needs to belch. She endorses associated mild nausea when the abdominal pain is waxing, but she denies any recent vomiting. She endorses eating ice cream in an attempt to alleviate her symptoms, but she denies it providing any relief. She denies taking any medications for her symptoms. She endorses a PMH of small bowel obstructions, and mentions that her current symptoms feel similar. She notes that with her first SBO she required surgery for resolution, but with a second SBO she only required pain medication as it resolved on its own. She endorses having a normal bowel movement this morning.     Patient endorses currently having a urostomy bag in place, and she denies any recent changes to it. She endorses a PMH of DM. She endorses allergies to statin medications, but she denies any other known medication allergies. She denies any recent fever, chills, chest pain, shortness of breath, or any other complications at this time.       RELEVANT HISTORY, MEDICATIONS, & ALLERGIES   Past medical history, surgical history, family history, medications, and allergies reviewed and pertinent noted in HPI.    REVIEW OF SYSTEMS:  A complete review of systems was performed with pertinent positives and negatives noted in the HPI. All other systems negative.     PHYSICAL EXAM:    Vitals: /60   Pulse 62   Temp 97.5  F (36.4  C) (Oral)   Resp 16   SpO2 94%    General: Alert and interactive, comfortable appearing.  HENT: Atraumatic. Full AROM of neck. Conjunctiva clear.   Cardiovascular: Regular rate and rhythm.   Chest/Pulmonary: Normal work of breathing. Speaking in complete sentences. Lungs CTAB. No chest wall tenderness or deformities.  Abdomen: Generalized abdominal tenderness to palpation. Urostomy bag in place with clear yellow urine. Midline surgical scar present. Soft, nondistended. No guarding or rebound.  Extremities: Normal AROM of all  major joints.  Skin: Warm and dry. Normal skin color.   Neuro: Speech clear. CNs grossly intact. Moves all extremities spontaneously.   Psych: Normal affect/mood, cooperative, memory appropriate.    LAB  Labs Ordered and Resulted from Time of ED Arrival to Time of ED Departure   COMPREHENSIVE METABOLIC PANEL - Abnormal       Result Value    Sodium 139      Potassium 3.9      Carbon Dioxide (CO2) 29      Anion Gap 9      Urea Nitrogen 25.5 (*)     Creatinine 1.20 (*)     GFR Estimate 45 (*)     Calcium 9.8      Chloride 101      Glucose 109 (*)     Alkaline Phosphatase 80      AST 23      ALT 15      Protein Total 6.9      Albumin 4.0      Bilirubin Total 0.4     LACTIC ACID WHOLE BLOOD - Normal    Lactic Acid 0.8     LIPASE - Normal    Lipase 39     CBC WITH PLATELETS - Normal    WBC Count 8.4      RBC Count 4.30      Hemoglobin 13.5      Hematocrit 39.9      MCV 93      MCH 31.4      MCHC 33.8      RDW 13.2      Platelet Count 244         RADIOLOGY  CT Abdomen Pelvis w Contrast   Final Result   IMPRESSION:    1.  Focally dilated loops of small bowel in the pelvis measuring up to 3.4 cm with transition point in the right pelvis. Findings of some suspicion for early small bowel obstruction. No free fluid.   2.  Cystectomy with right lower quadrant ileal conduit. New mild bilateral hydroureteronephrosis which extends down to level of the ileal conduit. Findings could reflect a mild stenosis at the level of the conduit.   3.  Small parastomal hernia containing fat.            I, Chema Long, am serving as a scribe to document services personally performed by Dr. Abiola Salgado based on my observation and the provider's statements to me. I, Abiola Salgado MD attest that Chema Long is acting in a scribe capacity, has observed my performance of the services and has documented them in accordance with my direction.    Abiola Salgado M.D.  Emergency Medicine  [facility]  [facility] EMERGENCY DEPARTMENT  [address] [place] [id number]  [phone]  Dept: [phone]       Abiola Salgado MD  10/22/23 0353

## 2023-10-22 NOTE — CONSULTS
General Surgery Consultation  Mi Paul MRN# [id number]   Age/Sex: 83 year old female YOB: 1940     Reason for consult: SBO       Requesting physician: Dr. Salgado                   Assessment and Plan:   Assessment:  1.  Recurrent SBO  2.  History of abdominal adhesions  3.  History of parastomal hernia  4.  Bladder cancer    83-year-old female presenting with recurrent SBO.  Recurrent SBO likely secondary to abdominal adhesions from previous surgeries.  Patient has had history is of recurrent SBO's in the past in which the patient had undergone exploratory laparotomy with lysis of adhesion for the SBO.    Plan:  -Recommend continue conservative management at this time.  We will offer the patient a Gastrografin challenge.  If the patient is excepting of drinking the contrast p.o., we can avoid the NG tube.  If the patient cannot tolerate, recommendation is placement of NG tube and then trial of Gastrografin.     -Continue with optimization of electrolyte  -Monitor for return of bowel function  -Medical management per primary team.       Chief Complaint:     Chief Complaint   Patient presents with    Abdominal Pain        History is obtained from the patient    HPI:   Mi Paul is a 83 year old female who presents to the ED with complaints of abdominal pain.  Patient was worked up and had a CT scan concerning for a small bowel obstruction.  Patient states that this abdominal pain is similar to her previous SBO's in the past.  Patient has had abdominal surgeries in the past causing abdominal adhesions.  The patient had abdominal lysis of adhesion in the past for her recurrent SBO's.  Currently patient has some mild nausea no vomiting.  Not passing flatus no bowel movement yet.          Past Medical History:     Past Medical History:   Diagnosis Date    Cancer (H)     Chronic diastolic congestive heart failure (H)     Diastolic dysfunction     DKA (diabetic ketoacidoses) 10/24/2019    Hypertension      Hypothyroidism     Osteoarthritis of multiple joints     Paroxysmal atrial fibrillation (H)     SBO (small bowel obstruction) (H)               Past Surgical History:     Past Surgical History:   Procedure Laterality Date    APPENDECTOMY  1946    ARTHROPLASTY HIP Left 10/2013    CATARACT EXTRACTION Left 12/2008    CATARACT EXTRACTION Right 01/2012    COLON SURGERY      CYSTOSCOPY  10/11/2017    CYSTOSCOPY, TRANSURETHRAL RESECTION (TUR) TUMOR BLADDER, COMBINED N/A 10/11/2017    Procedure: TRANSURETHRAL RESECTION OF BLADDER TUMOR;  Surgeon: Nii Vasquez MD;  Location: [facility];  Service:     DILATION AND CURETTAGE  1990    EYE SURGERY      HYSTERECTOMY      IR LUMBAR EPIDURAL STEROID INJECTION  7/31/2006    JOINT REPLACEMENT Bilateral     TKA    JOINT REPLACEMENT Bilateral     LIAN    LAPAROTOMY EXPLORATORY N/A 3/15/2017    Procedure:   LAPAROTOMY; LYSIS OF ADHESIONS;  Surgeon: Marcos Arenas MD;  Location: [facility];  Service:     LAPAROTOMY EXPLORATORY  03/15/2017    OTHER SURGICAL HISTORY  1971    sarcoid node biopsyx6     OTHER SURGICAL HISTORY Left 05/2013    tendon rupturerepair    REPAIR TENDON ACHILLES      TONSILLECTOMY  1943    TOTAL KNEE ARTHROPLASTY Right 06/2010    ZC TOTAL KNEE ARTHROPLASTY Left 2/11/2015    Procedure: LEFT KNEE TOTAL ARTHROPLASTY;  Surgeon: Stanley Machado MD;  Location: [facility];  Service: Orthopedics             Social History:               Family History:     Family History   Problem Relation Age of Onset    Breast Cancer Mother 60.00    Cerebrovascular Disease Mother     Throat cancer Father     Coronary Artery Disease Father     Hypothyroidism Sister     Prostate Cancer Brother     Prostate Cancer Paternal Uncle 60.00        metastatic to bone    Multiple myeloma Cousin 50.00    Multiple myeloma Cousin 50.00    Malig Hyperthermia No family hx of               Allergies:     Allergies   Allergen Reactions    Atorvastatin Unknown      "\"affected my liver, I had dark urine\"              Medications:     Prior to Admission medications    Medication Sig Start Date End Date Taking? Authorizing Provider   aspirin 81 MG EC tablet Take 81 mg by mouth at bedtime   Yes Reported, Patient   atenolol (TENORMIN) 25 MG tablet Take 12.5 mg by mouth daily 10/12/23  Yes Reported, Patient   docusate sodium (COLACE) 100 MG capsule Take 100 mg by mouth as needed for constipation   Yes Reported, Patient   ezetimibe (ZETIA) 10 MG tablet Take 10 mg by mouth every morning 9/15/23  Yes Reported, Patient   Insulin Aspart FlexPen 100 UNIT/ML SOPN INJECT 8 UNIT SUBCUTANEOUSLY AT BREAKFAST,6 AT LUNCH&9 AT DINNER PLUS SLIDING SCALE-MAX 30 UNIT/DAY 10/13/23  Yes Reported, Patient   LANTUS SOLOSTAR 100 UNIT/ML soln Inject Subcutaneous at bedtime 9/29/23  Yes Reported, Patient   levothyroxine (SYNTHROID/LEVOTHROID) 100 MCG tablet Take 100 mcg by mouth every morning (before breakfast)    Yes Malik Rutherford MD   magnesium gluconate (MAGONATE) 500 (27 Mg) MG tablet Take by mouth every morning Previously was taking 500 mg now taking 600 mg new dose = 1200 mg   Yes Reported, Patient   multivitamin (THERMEMS) TABS Take 1 tablet by mouth every evening    Yes Reported, Patient   polyethylene glycol (MIRALAX/GLYCOLAX) powder Take 1 capful by mouth daily as needed for constipation (mix with 8 ounces of liquid)   Yes Reported, Patient   blood glucose (ONETOUCH VERIO IQ) test strip Use to test blood sugar 4 times daily or as directed.    Reported, Patient   blood glucose monitoring (ONE TOUCH DELICA) lancets Use to test blood sugar 4 times daily or as directed.    Reported, Patient   blood glucose monitoring (ONETOUCH VERIO) meter device kit Use to test blood sugar 4 times daily or as directed.    Reported, Patient   Continuous Blood Gluc  (FREESTYLE TYLER 14 DAY READER) GERBER Use as directed to scan sensor and check blood sugar at least 4 times a day    Reported, Patient " "  Continuous Blood Gluc Sensor (FREESTYLE TYLER 14 DAY SENSOR) MISC Apply to arm as directed, scan sensor to check blood sugar at least once every 8 hours. Replace sensor every 14 days.    Reported, Patient              Review of Systems:   The Review of Systems is negative other than noted in the HPI            Physical Exam:   Patient Vitals for the past 24 hrs:   BP Temp Temp src Pulse Resp SpO2 Height Weight   10/22/23 0300 -- -- -- -- -- -- 1.689 m (5' 6.5\") 67.6 kg (149 lb 0.5 oz)   10/22/23 0230 125/60 -- -- 62 -- 94 % -- --   10/22/23 0200 137/63 -- -- 61 -- 95 % -- --   10/22/23 0130 (!) 140/64 -- -- 60 -- 96 % -- --   10/22/23 0109 -- -- -- 83 -- 95 % -- --   10/22/23 0101 (!) 150/68 -- -- 57 -- 96 % -- --   10/22/23 0032 (!) 165/72 -- -- 56 -- 98 % -- --   10/21/23 2232 (!) 184/116 97.5  F (36.4  C) Oral 74 16 95 % -- --        No intake or output data in the 24 hours ending 10/22/23 0635   Constitutional:   awake, alert, cooperative, no apparent distress, and appears stated age       Eyes:   PERRL, conjunctiva/corneas clear, EOM's intact; no scleral edema or icterus noted        ENT:   Normocephalic, without obvious abnormality, atraumatic, Lips, mucosa, and tongue normal        Hematologic / Lymphatic:   No lymphadenopathy       Lungs:   Normal respiratory effort, no accessory muscle use       Cardiovascular:   Regular rate and rhythm       Abdomen:   Abdomen soft, mildly distended, no tenderness to palpation       Musculoskeletal:   No obvious swelling, bruising or deformity       Skin:   Skin color and texture normal for patient, no rashes or lesions              Data:        All imaging studies reviewed by me.  I personally reviewed the imagings and agree with dilated loops of small bowel.  These findings are concerning for small bowel obstruction.    Panfilo Sanchez DO  General Surgeon  [address]  [address]  [place] [address]?  " Office: [phone]  Employed by [facility]  Pager: [phone]

## 2023-10-22 NOTE — PROGRESS NOTES
[facility]    Medicine Progress Note - Hospitalist Service    Date of Admission:  10/21/2023    Assessment & Plan     84 yo F with h/o hypothryoid, pAfib, HTN, HFpEF, sarcoidosis, bladder CA s/p resxn with ileal conduit, previous SBO requiring surgery, and DM2 on insulin (with DKA while on Keytruda in the past) who presents with recurrent partial SBO and incidentally noted bilateral hydroureteronephrosis with likely stenosis at the junction with the ileal conduit.  Patient was given gastrograffin challenge and has had good results and is feeling much better.  Urology was consulted and has recommended bilateral ureteral stents with IR for 10/23/23.    Principal Problem:    Partial small bowel obstruction   --surgery consulted, has h/o previous SBO requiring surgery in the past  --improved after gastrograffin  --start clear liquids but will be NPO p [place] for ureteral stents in AM      Hydroureteronephrosis bilateral  --urology consulted  --appears to be from stenosis at junction of ureters with ileal conduit  --plan for stents by IR in AM    Active Problems:    Hypothyroidism  -restart levothyroxine      Paroxysmal atrial fibrillation   -restart baby ASA and atenolol as at home      Hypertension  -restart atenolol as at home      Chronic diastolic congestive heart failure  -no signs of exac, continue atenolol      History of sarcoidosis      H/O bladder cancer resected with ileal conduit  --defer to urology      Type 2 diabetes mellitus with long-term current use of insulin  -has remote h/o DKA while on Keytruda but presumably that was drug induced  - restart lantus at lower dose since will be NPO for IR procedure tomorrow              Diet:  start clear liquids  DVT Prophylaxis: Pneumatic Compression Devices  Alcantara Catheter: Not present  Lines: None     Cardiac Monitoring: None  Code Status: Full Code      Clinically Significant Risk Factors Present on Admission                # Drug Induced  Platelet Defect: home medication list includes an antiplatelet medication   # Hypertension: Noted on problem list                 Disposition Plan      Expected Discharge Date: 10/24/2023                    Federico Sabillon MD  Hospitalist Service  [facility]  Securely message with [employer] (more info)  Text page via [employer] Paging/Directory   ______________________________________________________________________    Interval History   Feels much better since gastrograffin.      Physical Exam   Vital Signs: Temp: 97.5  F (36.4  C) Temp src: Oral BP: 125/60 Pulse: 62   Resp: 16 SpO2: 94 % O2 Device: None (Room air)    Weight: 149 lbs .5 oz    General Appearance: Elderly F in NAD  Respiratory: CTA  Cardiovascular: RRR S1S2  GI: +BS, soft, nontender, non-distended  Skin: no rashes or lesions on exposed areas, port site looks good, urostomy intact  Neuro: Alert, generally nonfocal on motor and sensory testing       Medical Decision Making       45 MINUTES SPENT BY ME on the date of service doing chart review, history, exam, documentation & further activities per the note.    Discussed with urology and nursing and patient.    Data     I have personally reviewed the following data over the past 24 hrs:    8.4  \   13.5   / 244     139 101 25.5 (H) /  307 (H)   3.9 29 1.20 (H) \     ALT: 15 AST: 23 AP: 80 TBILI: 0.4   ALB: 4.0 TOT PROTEIN: 6.9 LIPASE: 39     Procal: N/A CRP: N/A Lactic Acid: 0.8         Imaging results reviewed over the past 24 hrs:   Recent Results (from the past 24 hour(s))   CT Abdomen Pelvis w Contrast    Narrative    EXAM: CT ABDOMEN PELVIS W CONTRAST  LOCATION: [facility]  DATE: 10/22/2023    INDICATION: generalized abdominal pain, bloating, nausea; hx of obstruction  and  urostomy  COMPARISON: CT chest/abdomen/pelvis without contrast 05/01/2023  TECHNIQUE: CT scan of the abdomen and pelvis was performed following injection of IV contrast. Multiplanar  reformats were obtained. Dose reduction techniques were used.  CONTRAST: ISOVUE 370 75ML    FINDINGS:   LOWER CHEST: No focal pulmonary consolidation or pleural effusion.    HEPATOBILIARY: Normal.    PANCREAS: Stable mild irregular pancreatic ductal dilatation.    SPLEEN: Normal.    ADRENAL GLANDS: Normal.    KIDNEYS/BLADDER: Cystectomy. Right lower quadrant ileal conduit. Small parastomal hernia containing fat. New mild bilateral hydroureteronephrosis which extends down to level of the ileal conduit. Left renal simple cyst requiring no dedicated follow-up.    BOWEL: Partial small bowel resection. Focally dilated loops of small bowel in the pelvis measuring up to 3.4 cm with transition point in the right pelvis.    LYMPH NODES: Normal.    VASCULATURE: Atherosclerotic calcifications of the aortoiliac vessels without evidence of aneurysmal dilatation.    PELVIC ORGANS: Hysterectomy.    MUSCULOSKELETAL: Bilateral hip arthroplasties. Multilevel degenerative changes of the thoracic and lumbosacral spine. No acute osseous abnormality or suspicious bony lesion.      Impression    IMPRESSION:   1.  Focally dilated loops of small bowel in the pelvis measuring up to 3.4 cm with transition point in the right pelvis. Findings of some suspicion for early small bowel obstruction. No free fluid.  2.  Cystectomy with right lower quadrant ileal conduit. New mild bilateral hydroureteronephrosis which extends down to level of the ileal conduit. Findings could reflect a mild stenosis at the level of the conduit.  3.  Small parastomal hernia containing fat.

## 2023-10-22 NOTE — H&P
[facility]    History and Physical - Hospitalist Service       Date of Admission:  10/21/2023    Assessment & Plan   Mi Paul is a 83 year old female who with a medical history significant for bladder cancer with prior abdominal surgery and prior history of small bowel obstruction that required surgical intervention who is admitted with acute small bowel obstruction.      Patient Active Problem List   Diagnosis    Small bowel obstruction (H)    Hypothyroidism    Osteoarthritis of multiple joints    Paroxysmal atrial fibrillation (H)    Hypertension    Diastolic dysfunction    Malignant neoplasm of overlapping sites of bladder (H)    DKA (diabetic ketoacidoses)    Secondary DM with DKA (H)    Chronic diastolic congestive heart failure (H)    SBO (small bowel obstruction) (H)    Hypomagnesemia    Generalized abdominal pain    Partial small bowel obstruction (H)    Bowel obstruction (H)       Acute small bowel obstruction-CT shows partial.  N.p.o. for now except medication, pain control.  Patient has had prior bowel obstruction that required surgical intervention.  General surgery consulted.  D5 saline running.    History of bladder cancer-stage II.  Status post surgical intervention.  Also has had hysterectomy, prior history of laparotomy for bowel obstruction.  No NG tube past for now.  We will follow.  Low threshold to place NG tube.  A.m. team to follow.    Diastolic dysfunction-no active respiratory distress.  May need to give IV medication as needed.  For blood pressure control.  We will resume atenolol however.  Only essential medications ordered    Hypothyroidism-continue Synthroid    Paroxysmal A-fib continue Tylenol.  Telemetry monitoring.-    Type II DM sliding scale insulin, resume home Lantus.  Dose decreased.    Rest of patient's medical problems-hyperlipidemia, bladder cancer management remains the same as outpatient      Diet:  N.p.o. D5 saline  DVT Prophylaxis: Low  Risk/Ambulatory with no VTE prophylaxis indicated  [place]ara Catheter: Not present  Lines: None     Cardiac Monitoring: Peripheral  Code Status:  Full code    Clinically Significant Risk Factors Present on Admission                # Drug Induced Platelet Defect: home medication list includes an antiplatelet medication   # Hypertension: Noted on problem list                 Disposition Plan           Michaela Witt MD  Hospitalist Service  [facility]  Securely message with [employer] (more info)  Text page via [employer] Paging/Directory     ______________________________________________________________________    Chief Complaint   Abdominal pain        History of Present Illness   Mi Paul is a 83 year old female who with a medical history significant for bladder cancer with prior abdominal surgery and prior history of small bowel obstruction that required surgical intervention who is admitted with acute small bowel obstruction.    Patient was seen in the ER.  She was awake alert oriented to place person and time and could provide a history.  Per reports patient presented to the ER with abdominal pain that has started about an goal.  She reported ongoing nausea but no active vomiting.    Preliminary work-up done showed a BMP which was unremarkable.  BUN was 25, creatinine 1.2 which was baseline.  CBC was unremarkable.    CT abdomen and pelvis done showed results below    Narrative & Impression   EXAM: CT ABDOMEN PELVIS W CONTRAST  LOCATION: [facility]  DATE: 10/22/2023     INDICATION: generalized abdominal pain, bloating, nausea; hx of obstruction  and  urostomy  COMPARISON: CT chest/abdomen/pelvis without contrast 05/01/2023  TECHNIQUE: CT scan of the abdomen and pelvis was performed following injection of IV contrast. Multiplanar reformats were obtained. Dose reduction techniques were used.  CONTRAST: ISOVUE 370 75ML     FINDINGS:   LOWER CHEST: No focal pulmonary  consolidation or pleural effusion.     HEPATOBILIARY: Normal.     PANCREAS: Stable mild irregular pancreatic ductal dilatation.     SPLEEN: Normal.     ADRENAL GLANDS: Normal.     KIDNEYS/BLADDER: Cystectomy. Right lower quadrant ileal conduit. Small parastomal hernia containing fat. New mild bilateral hydroureteronephrosis which extends down to level of the ileal conduit. Left renal simple cyst requiring no dedicated follow-up.     BOWEL: Partial small bowel resection. Focally dilated loops of small bowel in the pelvis measuring up to 3.4 cm with transition point in the right pelvis.     LYMPH NODES: Normal.     VASCULATURE: Atherosclerotic calcifications of the aortoiliac vessels without evidence of aneurysmal dilatation.     PELVIC ORGANS: Hysterectomy.     MUSCULOSKELETAL: Bilateral hip arthroplasties. Multilevel degenerative changes of the thoracic and lumbosacral spine. No acute osseous abnormality or suspicious bony lesion.                                                                      IMPRESSION:   1.  Focally dilated loops of small bowel in the pelvis measuring up to 3.4 cm with transition point in the right pelvis. Findings of some suspicion for early small bowel obstruction. No free fluid.  2.  Cystectomy with right lower quadrant ileal conduit. New mild bilateral hydroureteronephrosis which extends down to level of the ileal conduit. Findings could reflect a mild stenosis at the level of the conduit.  3.  Small parastomal hernia containing fat.     She was not actively vomiting and has an NG tube was not passed.    Patient reports she had been getting constipation the past 2 weeks and decided to start MiraLAX.  She is not clear about dietary changes to make after the bowel obstruction.  She has had multiple surgeries on her abdomen and has had 3 bowel obstructions including this 1.    General surgery has been consulted.  A Gastrografin challenge has been recommended.  Her last bowel movement was  yesterday afternoon and she last passed gas yesterday afternoon.  His abdominal pain worsened to about a 6 out of 10 this morning but improved with Dilaudid.    Past Medical History    Past Medical History:   Diagnosis Date    Cancer (H)     Chronic diastolic congestive heart failure (H)     Diastolic dysfunction     DKA (diabetic ketoacidoses) 10/24/2019    Hypertension     Hypothyroidism     Osteoarthritis of multiple joints     Paroxysmal atrial fibrillation (H)     SBO (small bowel obstruction) (H)        Past Surgical History   Past Surgical History:   Procedure Laterality Date    APPENDECTOMY  1946    ARTHROPLASTY HIP Left 10/2013    CATARACT EXTRACTION Left 12/2008    CATARACT EXTRACTION Right 01/2012    COLON SURGERY      CYSTOSCOPY  10/11/2017    CYSTOSCOPY, TRANSURETHRAL RESECTION (TUR) TUMOR BLADDER, COMBINED N/A 10/11/2017    Procedure: TRANSURETHRAL RESECTION OF BLADDER TUMOR;  Surgeon: Nii Vasquez MD;  [facility];  Service:     DILATION AND CURETTAGE  1990    EYE SURGERY      HYSTERECTOMY      IR LUMBAR EPIDURAL STEROID INJECTION  7/31/2006    JOINT REPLACEMENT Bilateral     TKA    JOINT REPLACEMENT Bilateral     LIAN    LAPAROTOMY EXPLORATORY N/A 3/15/2017    Procedure:   LAPAROTOMY; LYSIS OF ADHESIONS;  Surgeon: Marcos Arenas MD;  Location: [facility];  Service:     LAPAROTOMY EXPLORATORY  03/15/2017    OTHER SURGICAL HISTORY  1971    sarcoid node biopsyx6     OTHER SURGICAL HISTORY Left 05/2013    tendon rupturerepair    REPAIR TENDON ACHILLES      TONSILLECTOMY  1943    TOTAL KNEE ARTHROPLASTY Right 06/2010    [facility] TOTAL KNEE ARTHROPLASTY Left 2/11/2015    Procedure: LEFT KNEE TOTAL ARTHROPLASTY;  Surgeon: Stanley Machado MD;  Location: [facility];  Service: Orthopedics       Prior to Admission Medications   Prior to Admission Medications   Prescriptions Last Dose Informant Patient Reported? Taking?   Continuous Blood Gluc  ([employer] TYLER 14  DAY READER) GERBER   Yes No   Sig: Use as directed to scan sensor and check blood sugar at least 4 times a day   Continuous Blood Gluc Sensor (FREESTYLE TYLER 14 DAY SENSOR) MISC   Yes No   Sig: Apply to arm as directed, scan sensor to check blood sugar at least once every 8 hours. Replace sensor every 14 days.   Insulin Aspart FlexPen 100 UNIT/ML SOPN   Yes Yes   Sig: INJECT 8 UNIT SUBCUTANEOUSLY AT BREAKFAST,6 AT LUNCH&9 AT DINNER PLUS SLIDING SCALE-MAX 30 UNIT/DAY   LANTUS SOLOSTAR 100 UNIT/ML soln   Yes Yes   Sig: Inject Subcutaneous at bedtime   Pembrolizumab (KEYTRUDA IV) Not Taking  Yes No   Patient not taking: Reported on 10/22/2023   aspirin 81 MG EC tablet   Yes No   Sig: Take 81 mg by mouth at bedtime   atenolol (TENORMIN) 25 MG tablet   Yes Yes   Sig: Take 12.5 mg by mouth daily   atenolol (TENORMIN) 50 MG tablet Not Taking at am  Yes No   Sig: Take 50 mg by mouth daily    Patient not taking: Reported on 10/22/2023   blood glucose (ONETOUCH VERIO IQ) test strip   Yes No   Sig: Use to test blood sugar 4 times daily or as directed.   blood glucose monitoring (ONE TOUCH DELICA) lancets   Yes No   Sig: Use to test blood sugar 4 times daily or as directed.   blood glucose monitoring (ONETOUCH VERIO) meter device kit   Yes No   Sig: Use to test blood sugar 4 times daily or as directed.   docusate sodium (COLACE) 100 MG capsule Past Week at prn  Yes Yes   Sig: Take 100 mg by mouth as needed for constipation   ezetimibe (ZETIA) 10 MG tablet   Yes Yes   Sig: Take 10 mg by mouth every morning   insulin isophane & regular (NOVOLIN 70/30 FLEXPEN RELION) susp Not Taking at lantus 12 units and Novolog 8@am 6@pm 9qhs  Yes No   Sig: Inject 19 units under the skin 30 minutes before breakfast and 14 units 30 minutes before dinner.   Patient not taking: Reported on 10/22/2023   levothyroxine (SYNTHROID/LEVOTHROID) 100 MCG tablet Unknown  Yes Yes   Sig: Take 100 mcg by mouth every morning (before breakfast)    magnesium  "gluconate (MAGONATE) 500 (27 Mg) MG tablet   Yes Yes   Sig: Take by mouth every morning Previously was taking 500 mg now taking 600 mg new dose = 1200 mg   multivitamin (THERMEMS) TABS 10/21/2023 at am  Yes Yes   Sig: Take 1 tablet by mouth every evening    polyethylene glycol (MIRALAX/GLYCOLAX) powder Past Month  Yes Yes   Sig: Take 1 capful by mouth daily as needed for constipation (mix with 8 ounces of liquid)      Facility-Administered Medications: None        Review of Systems    The 10 point Review of Systems is negative other than noted in the HPI or here.     Social History   I have reviewed this patient's social history and updated it with pertinent information if needed.         Family History   I have reviewed this patient's family history and updated it with pertinent information if needed.  Family History   Problem Relation Age of Onset    Breast Cancer Mother 60.00    Cerebrovascular Disease Mother     Throat cancer Father     Coronary Artery Disease Father     Hypothyroidism Sister     Prostate Cancer Brother     Prostate Cancer Paternal Uncle 60.00        metastatic to bone    Multiple myeloma Cousin 50.00    Multiple myeloma Cousin 50.00    Malig Hyperthermia No family hx of          Allergies   Allergies   Allergen Reactions    Atorvastatin Unknown     \"affected my liver, I had dark urine\"        Physical Exam   Vital Signs: Temp: 97.5  F (36.4  C) Temp src: Oral BP: 125/60 Pulse: 62   Resp: 16 SpO2: 94 % O2 Device: None (Room air)    Weight: 0 lbs 0 oz      General Aox3, appropriate affect, NAD, on RA, pleasant  HEENT  MMM, EOMI, PERRL  Chest Adeq E b/l, No wheezing  Heart RRR, No M/R/G  Abd- Soft, NT, BS+ positive surgical scar  - stoma+  Extremity- Moving all extremities, No digital clubbing,   No edema  Neuro- Aox3, CN II- XII intact, No peripheral vision loss  P5/5, T-N, reflexes 2+, plantar reflex downwards,  gait not checked  Skin  Has no tattoo, No skin rash     Medical Decision Making "       90 MINUTES SPENT BY ME on the date of service doing chart review, history, exam, documentation & further activities per the note.      ------------------ MEDICAL DECISION MAKING ------------------------------------------------------------------------------------------------------      Data     I have personally reviewed the following data over the past 24 hrs:    8.4  \   13.5   / 244     139 101 25.5 (H) /  109 (H)   3.9 29 1.20 (H) \     ALT: 15 AST: 23 AP: 80 TBILI: 0.4   ALB: 4.0 TOT PROTEIN: 6.9 LIPASE: 39     Procal: N/A CRP: N/A Lactic Acid: 0.8         Imaging results reviewed over the past 24 hrs:   Recent Results (from the past 24 hour(s))   CT Abdomen Pelvis w Contrast    Narrative    EXAM: CT ABDOMEN PELVIS W CONTRAST  LOCATION: [facility]  DATE: 10/22/2023    INDICATION: generalized abdominal pain, bloating, nausea; hx of obstruction  and  urostomy  COMPARISON: CT chest/abdomen/pelvis without contrast 05/01/2023  TECHNIQUE: CT scan of the abdomen and pelvis was performed following injection of IV contrast. Multiplanar reformats were obtained. Dose reduction techniques were used.  CONTRAST: ISOVUE 370 75ML    FINDINGS:   LOWER CHEST: No focal pulmonary consolidation or pleural effusion.    HEPATOBILIARY: Normal.    PANCREAS: Stable mild irregular pancreatic ductal dilatation.    SPLEEN: Normal.    ADRENAL GLANDS: Normal.    KIDNEYS/BLADDER: Cystectomy. Right lower quadrant ileal conduit. Small parastomal hernia containing fat. New mild bilateral hydroureteronephrosis which extends down to level of the ileal conduit. Left renal simple cyst requiring no dedicated follow-up.    BOWEL: Partial small bowel resection. Focally dilated loops of small bowel in the pelvis measuring up to 3.4 cm with transition point in the right pelvis.    LYMPH NODES: Normal.    VASCULATURE: Atherosclerotic calcifications of the aortoiliac vessels without evidence of aneurysmal dilatation.    PELVIC  ORGANS: Hysterectomy.    MUSCULOSKELETAL: Bilateral hip arthroplasties. Multilevel degenerative changes of the thoracic and lumbosacral spine. No acute osseous abnormality or suspicious bony lesion.      Impression    IMPRESSION:   1.  Focally dilated loops of small bowel in the pelvis measuring up to 3.4 cm with transition point in the right pelvis. Findings of some suspicion for early small bowel obstruction. No free fluid.  2.  Cystectomy with right lower quadrant ileal conduit. New mild bilateral hydroureteronephrosis which extends down to level of the ileal conduit. Findings could reflect a mild stenosis at the level of the conduit.  3.  Small parastomal hernia containing fat.

## 2023-10-22 NOTE — MEDICATION SCRIBE - ADMISSION MEDICATION HISTORY
Medication History was completed by Trish Cade medication scribhiral. Patient was unsure of her home levothyroxine dose when talking to med gabriele, I clarified in AM that patient takes 112 mcg and updated list. Also clarified home Lantus and Novolog dose and updated list.     Medication Scribe Admission Medication History    Admission medication history is complete. The information provided in this note is only as accurate as the sources available at the time of the update.    Information Source(s): Patient via in-person    Pertinent Information:  Patient reported not knowing her Levothyroxine dose, didn't believe it was neither 100 or 112 mcg dose.    Changes made to PTA medication list:  Added: Zetia, Novolog, Lantus  Deleted: Novolin 70/30 patient was changed to Novolog & Lantus, Keytruda completed.  Changed: Atenolol was 50 mg but is now 12.5 mg doses (takes 1/2 ab of 25 mg tab)    Medication Affordability:  Not including over the counter (OTC) medications, was there a time in the past 3 months when you did not take your medications as prescribed because of cost?: No    Allergies reviewed with patient and updates made in EHR: yes    Medication History Completed By: Trish Cade 10/22/2023 6:30 AM    Prior to Admission medications    Medication Sig Last Dose Taking? Auth Provider Long Term End Date   aspirin 81 MG EC tablet Take 81 mg by mouth at bedtime 10/21/2023 at am Yes Reported, Patient     atenolol (TENORMIN) 25 MG tablet Take 12.5 mg by mouth daily 10/21/2023 at am Yes Reported, Patient     docusate sodium (COLACE) 100 MG capsule Take 100 mg by mouth as needed for constipation Past Week at prn Yes Reported, Patient     ezetimibe (ZETIA) 10 MG tablet Take 10 mg by mouth every morning 10/21/2023 at am Yes Reported, Patient Yes    Insulin Aspart FlexPen 100 UNIT/ML SOPN INJECT 8 UNIT SUBCUTANEOUSLY AT BREAKFAST,6 AT LUNCH&9 AT DINNER PLUS SLIDING SCALE-MAX 30 UNIT/DAY 10/21/2023 at noon Yes Reported, Patient      LANTUS SOLOSTAR 100 UNIT/ML soln Inject Subcutaneous at bedtime 10/20/2023 at pm Yes Reported, Patient Yes    levothyroxine (SYNTHROID/LEVOTHROID) 100 MCG tablet Take 100 mcg by mouth every morning (before breakfast)  Unknown at known Yes Malik Rutherford MD     magnesium gluconate (MAGONATE) 500 (27 Mg) MG tablet Take by mouth every morning Previously was taking 500 mg now taking 600 mg new dose = 1200 mg 10/21/2023 at am Yes Reported, Patient     multivitamin (THERMEMS) TABS Take 1 tablet by mouth every evening  10/21/2023 at am Yes Reported, Patient     polyethylene glycol (MIRALAX/GLYCOLAX) powder Take 1 capful by mouth daily as needed for constipation (mix with 8 ounces of liquid) Past Month at prn Yes Reported, Patient     blood glucose (ONETOUCH VERIO IQ) test strip Use to test blood sugar 4 times daily or as directed.   Reported, Patient     blood glucose monitoring (ONE TOUCH DELICA) lancets Use to test blood sugar 4 times daily or as directed.   Reported, Patient     blood glucose monitoring (ONETOUCH VERIO) meter device kit Use to test blood sugar 4 times daily or as directed.   Reported, Patient     Continuous Blood Gluc  (FREESTYLE TYLER 14 DAY READER) GERBER Use as directed to scan sensor and check blood sugar at least 4 times a day   Reported, Patient     Continuous Blood Gluc Sensor (FREESTYLE TYLER 14 DAY SENSOR) MISC Apply to arm as directed, scan sensor to check blood sugar at least once every 8 hours. Replace sensor every 14 days.   Reported, Patient

## 2023-10-22 NOTE — CONSULTS
.CLINICAL NUTRITION SERVICES - EDUCATION NOTE    Received consult to provide type of diet to eat after third bowel obstruction to prevent further recurrence     NUTRITION HISTORY:  Information obtained from patient:  She takes citracel daily (this is soluble fiber and should be fine with plenty of water)  She often has constipation.  She does remove skin from apples.  She does not like bran.    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  She has trouble keeping her blood sugar controlled.     NUTRITION DIAGNOSIS:  Food- and nutrition-related knowledge deficit related to a low insoluble fiber diet as evidenced by recurrent bowel obstructions.    INTERVENTIONS:  Provided instruction on eating to try to avoid recurrent bowel obstruction - smaller frequent meals, chewing food very well, drinking plenty of fluids, keeping BG controlled if possible, and limiting insoluble fiber - Whole nuts, seeds, skins, etc...    Provided  the following handouts: Restricted Fiber Nutrition Therapy    Goals:  Patient verbalizes understanding of diet.    Follow Up:  Patient to ask any further nutrition-related questions before discharge. In addition, pt may request outpatient RD appointment.     RD contact information provided.

## 2023-10-22 NOTE — PROGRESS NOTES
Report given to P2 RN. Patient A&O x4 in stable condition. Ambulating independently. Having many loose stools and making good urine from her urostomy. No reports of pain. Transferred to P2 with all her belongings.

## 2023-10-22 NOTE — ED NOTES
BEDSIDE GLUCOSE PER [facility]EX[facility] 223. HOLDING D5NS AT THIS TIME. REQUESTED INSULIN PEN FROM PHARMACY. PT REQUESTED TO HAVE SLIDING SCALE INSULIN EARLY.

## 2023-10-22 NOTE — CONSULTS
"  [facility] CONSULTATION    Type of Consult: inpatient  Place of Service: [facility]   Reason for consult: Bilateral hydroureteronephrosis, history of cystectomy with ileal conduit diversion  Request for consult by: Dr. Sabillon    History of present illness:   Mi Paul is a 83 year old female with a history of SBO, bladder cancer s/p cystectomy with ileal conduit diversion that was admitted for Generalized abdominal pain, SBO.  Urology is being consulted for bilateral hydroureteronephrosis seen on CT. History obtained through patient and chart review.     Patient has a history of bladder cancer and is s/p cystectomy with ileal conduit diversion about 5 years ago.  She currently follows with Dr. Nii Vasquez and most recently saw him on 9/15/2023 for her yearly check-in.  She sees Dr. Kyle with [facility] every 6 months. She states that her scans have been \"fine\".  No issues with her stoma.    Patient was admitted on 10/21/2023 with abdominal pain, found to have acute small bowel obstruction.  General surgery consulted, conservative measures and Gastrografin challenge recommended at this time.  NG tube not indicated at this time.  Normal vitals, patient afebrile.  Admission labs notable for creatinine 1.20 (baseline), WBC 8.4, UA pending.  CT A/P with contrast from yesterday urologically notable for new mild bilateral hydroureteronephrosis which extends down to the level of the ileal conduit.  Could reflect a mild stenosis at the level of the conduit.  Early SBO noted with transition point in the right pelvis.  Urology consulted for hydronephrosis management.    Patient feels well at this time.  She denies any flank pain.  She has had normal output in her ileostomy.  No fevers.    Past Medical History:  Past Medical History:   Diagnosis Date    Cancer (H)     Chronic diastolic congestive heart failure (H)     Diastolic dysfunction     DKA (diabetic ketoacidoses) 10/24/2019    Hypertension "     Hypothyroidism     Osteoarthritis of multiple joints     Paroxysmal atrial fibrillation (H)     SBO (small bowel obstruction) (H)        Past Surgical History:  Past Surgical History:   Procedure Laterality Date    APPENDECTOMY  1946    ARTHROPLASTY HIP Left 10/2013    CATARACT EXTRACTION Left 12/2008    CATARACT EXTRACTION Right 01/2012    COLON SURGERY      CYSTOSCOPY  10/11/2017    CYSTOSCOPY, TRANSURETHRAL RESECTION (TUR) TUMOR BLADDER, COMBINED N/A 10/11/2017    Procedure: TRANSURETHRAL RESECTION OF BLADDER TUMOR;  Surgeon: Nii Vasquez MD;  Location: [facility];  Service:     DILATION AND CURETTAGE  1990    EYE SURGERY      HYSTERECTOMY      IR LUMBAR EPIDURAL STEROID INJECTION  7/31/2006    JOINT REPLACEMENT Bilateral     TKA    JOINT REPLACEMENT Bilateral     LIAN    LAPAROTOMY EXPLORATORY N/A 3/15/2017    Procedure:   LAPAROTOMY; LYSIS OF ADHESIONS;  Surgeon: Marcos Arenas MD;  Location: [facility];  Service:     LAPAROTOMY EXPLORATORY  03/15/2017    OTHER SURGICAL HISTORY  1971    sarcoid node biopsyx6     OTHER SURGICAL HISTORY Left 05/2013    tendon rupturerepair    REPAIR TENDON ACHILLES      TONSILLECTOMY  1943    TOTAL KNEE ARTHROPLASTY Right 06/2010    Z TOTAL KNEE ARTHROPLASTY Left 2/11/2015    Procedure: LEFT KNEE TOTAL ARTHROPLASTY;  Surgeon: Stanley Machado MD;  Location: [facility];  Service: Orthopedics       Social History:  Social History     Socioeconomic History    Marital status:      Spouse name: Not on file    Number of children: Not on file    Years of education: Not on file    Highest education level: Not on file   Occupational History    Not on file   Tobacco Use    Smoking status: Not on file    Smokeless tobacco: Not on file   Substance and Sexual Activity    Alcohol use: Not on file    Drug use: Not on file    Sexual activity: Not Currently   Other Topics Concern    Not on file   Social History Narrative    Not on file     Social  "Determinants of Health     Financial Resource Strain: Not on file   Food Insecurity: Not on file   Transportation Needs: Not on file   Physical Activity: Not on file   Stress: Not on file   Social Connections: Not on file   Interpersonal Safety: Not on file   Housing Stability: Not on file       Medications:  Current Facility-Administered Medications   Medication    bisacodyl (DULCOLAX) suppository 10 mg    glucose gel 15-30 g    Or    dextrose 50 % injection 25-50 mL    Or    glucagon injection 1 mg    famotidine (PEPCID) injection 20 mg    HYDROmorphone (DILAUDID) injection 0.3 mg    insulin aspart (NovoLOG) injection (RAPID ACTING)    melatonin tablet 1 mg    sodium chloride 0.9 % infusion     Current Outpatient Medications   Medication    aspirin 81 MG EC tablet    atenolol (TENORMIN) 25 MG tablet    docusate sodium (COLACE) 100 MG capsule    ezetimibe (ZETIA) 10 MG tablet    Insulin Aspart FlexPen 100 UNIT/ML SOPN    LANTUS SOLOSTAR 100 UNIT/ML soln    levothyroxine (SYNTHROID/LEVOTHROID) 112 MCG tablet    magnesium gluconate (MAGONATE) 500 (27 Mg) MG tablet    multivitamin (THERMEMS) TABS    polyethylene glycol (MIRALAX/GLYCOLAX) powder    blood glucose (ONETOUCH VERIO IQ) test strip    blood glucose monitoring (ONE TOUCH DELICA) lancets    blood glucose monitoring (ONETOUCH VERIO) meter device kit    Continuous Blood Gluc  (FREESTYLE TYLER 14 DAY READER) GERBER    Continuous Blood Gluc Sensor (FREESTYLE TYLER 14 DAY SENSOR) MISC       Allergies:   Allergies   Allergen Reactions    Atorvastatin Unknown     \"affected my liver, I had dark urine\"       Review of Systems:   A full 12 point review of systems was taken and is negative aside from what is noted above in the HPI    Physical Exam:  Temp:  [97.5  F (36.4  C)-98  F (36.7  C)] 98  F (36.7  C)  Pulse:  [56-83] 58  Resp:  [16-18] 18  BP: (125-184)/() 136/63  SpO2:  [94 %-98 %] 94 %  General: NAD, alert, cooperative  Head: normocephalic, without " abnormality / atraumatic  Abdomen: soft, mildly distended, nontender. No suprapubic fullness/tenderness. No CVA tenderness noted. Ileostomy present, stoma appears pink and is nontender. Clear yellow output into bag.   Skin: no rashes or lesions  Musculoskeletal: moves all extremities equally; no calf edema or tenderness  Psychological: alert and oriented, answers questions appropriately      Labs:   Creatinine   Date Value Ref Range Status   10/21/2023 1.20 (H) 0.51 - 0.95 mg/dL Final   10/25/2019 1.08 0.60 - 1.10 mg/dL Final       Lab Results   Component Value Date    WBC 8.4 10/21/2023     Lab Results   Component Value Date    HGB 13.5 10/21/2023     Lab Results   Component Value Date     10/21/2023       UA RESULTS:  Recent Labs   Lab Test 08/11/20  2240   COLOR Straw   APPEARANCE Cloudy*   URINEGLC Negative   URINEBILI Negative   URINEKETONE 20 mg/dL*   SG 1.048*   UBLD Trace*   URINEPH 6.5   PROTEIN Trace*   UROBILINOGEN <2.0 E.U./dL   NITRITE Positive*   LEUKEST Negative   RBCU 3-5*   WBCU 0-5         Urine culture: pending    Lab Results: personally reviewed     Imaging:  EXAM: CT ABDOMEN PELVIS W CONTRAST  LOCATION: [facility]  DATE: 10/22/2023     INDICATION: generalized abdominal pain, bloating, nausea; hx of obstruction  and  urostomy  COMPARISON: CT chest/abdomen/pelvis without contrast 05/01/2023  TECHNIQUE: CT scan of the abdomen and pelvis was performed following injection of IV contrast. Multiplanar reformats were obtained. Dose reduction techniques were used.  CONTRAST: ISOVUE 370 75ML     FINDINGS:   LOWER CHEST: No focal pulmonary consolidation or pleural effusion.     HEPATOBILIARY: Normal.     PANCREAS: Stable mild irregular pancreatic ductal dilatation.     SPLEEN: Normal.     ADRENAL GLANDS: Normal.     KIDNEYS/BLADDER: Cystectomy. Right lower quadrant ileal conduit. Small parastomal hernia containing fat. New mild bilateral hydroureteronephrosis which extends  down to level of the ileal conduit. Left renal simple cyst requiring no dedicated follow-up.     BOWEL: Partial small bowel resection. Focally dilated loops of small bowel in the pelvis measuring up to 3.4 cm with transition point in the right pelvis.     LYMPH NODES: Normal.     VASCULATURE: Atherosclerotic calcifications of the aortoiliac vessels without evidence of aneurysmal dilatation.     PELVIC ORGANS: Hysterectomy.     MUSCULOSKELETAL: Bilateral hip arthroplasties. Multilevel degenerative changes of the thoracic and lumbosacral spine. No acute osseous abnormality or suspicious bony lesion.                                                                      IMPRESSION:   1.  Focally dilated loops of small bowel in the pelvis measuring up to 3.4 cm with transition point in the right pelvis. Findings of some suspicion for early small bowel obstruction. No free fluid.  2.  Cystectomy with right lower quadrant ileal conduit. New mild bilateral hydroureteronephrosis which extends down to level of the ileal conduit. Findings could reflect a mild stenosis at the level of the conduit.  3.  Small parastomal hernia containing fat.    I have personally reviewed the imaging reports above.     Assessment / Plan : Mi Paul has a history of bladder cancer and is s/p cystectomy with ileal conduit diversion (about 5 years ago) is being seen by [facility] for new bilateral hydroureteronephrosis.    -83-year-old female is admitted with a small bowel obstruction.  General surgery consulted, conservative measures recommended.  -CT from 10/21 incidentally showed new mild bilateral hydroureteronephrosis possibly from stenosis at the level of the conduit.    -Patient has no flank pain.  Good UOP.  Creatinine baseline.  Normal WBC and no infectious symptoms. No emergent intervention needed.  -Given bilateral nature of hydro, patient would benefit from bilateral ureteral stent placement this admission. Will need to be  placed by IR due to anatomy.  Consult placed for tomorrow morning.    -N.p.o. at midnight.  -Please hold aspirin if able in lieu of procedure.  -Urology will continue to follow.     This case was discussed with:  Dr. Luis M Thayer    Thank you for consulting [facility] regarding this patient's care. Please contact us with questions or concerns.     Sarah Anton PA-C  [facility]   [phone]

## 2023-10-23 LAB
CREAT SERPL-MCNC: 1.12 MG/DL (ref 0.51–0.95)
EGFRCR SERPLBLD CKD-EPI 2021: 49 ML/MIN/1.73M2
GLUCOSE BLDC GLUCOMTR-MCNC: 353 MG/DL (ref 70–99)
GLUCOSE BLDC GLUCOMTR-MCNC: 355 MG/DL (ref 70–99)
GLUCOSE BLDC GLUCOMTR-MCNC: 360 MG/DL (ref 70–99)
GLUCOSE BLDC GLUCOMTR-MCNC: 388 MG/DL (ref 70–99)
GLUCOSE BLDC GLUCOMTR-MCNC: 393 MG/DL (ref 70–99)
INR PPP: 1.3 (ref 0.85–1.15)

## 2023-10-23 PROCEDURE — 85610 PROTHROMBIN TIME: CPT

## 2023-10-23 PROCEDURE — 258N000003 HC RX IP 258 OP 636: Performed by: INTERNAL MEDICINE

## 2023-10-23 PROCEDURE — 36415 COLL VENOUS BLD VENIPUNCTURE: CPT | Performed by: NURSE PRACTITIONER

## 2023-10-23 PROCEDURE — 99231 SBSQ HOSP IP/OBS SF/LOW 25: CPT | Performed by: SURGERY

## 2023-10-23 PROCEDURE — 120N000001 HC R&B MED SURG/OB

## 2023-10-23 PROCEDURE — 250N000013 HC RX MED GY IP 250 OP 250 PS 637: Performed by: INTERNAL MEDICINE

## 2023-10-23 PROCEDURE — 999N000197 HC STATISTIC WOC PT EDUCATION, 0-15 MIN

## 2023-10-23 PROCEDURE — 82565 ASSAY OF CREATININE: CPT | Performed by: NURSE PRACTITIONER

## 2023-10-23 PROCEDURE — 250N000011 HC RX IP 250 OP 636: Mod: JZ | Performed by: INTERNAL MEDICINE

## 2023-10-23 PROCEDURE — 99232 SBSQ HOSP IP/OBS MODERATE 35: CPT | Performed by: STUDENT IN AN ORGANIZED HEALTH CARE EDUCATION/TRAINING PROGRAM

## 2023-10-23 RX ORDER — ONDANSETRON 2 MG/ML
4 INJECTION INTRAMUSCULAR; INTRAVENOUS EVERY 6 HOURS PRN
Status: DISCONTINUED | OUTPATIENT
Start: 2023-10-23 | End: 2023-10-25 | Stop reason: HOSPADM

## 2023-10-23 RX ADMIN — SODIUM CHLORIDE: 9 INJECTION, SOLUTION INTRAVENOUS at 02:38

## 2023-10-23 RX ADMIN — ONDANSETRON 4 MG: 2 INJECTION INTRAMUSCULAR; INTRAVENOUS at 06:39

## 2023-10-23 RX ADMIN — Medication 500 MG: at 21:05

## 2023-10-23 RX ADMIN — SODIUM CHLORIDE: 9 INJECTION, SOLUTION INTRAVENOUS at 21:04

## 2023-10-23 RX ADMIN — SODIUM CHLORIDE: 9 INJECTION, SOLUTION INTRAVENOUS at 12:41

## 2023-10-23 RX ADMIN — SODIUM CHLORIDE 500 ML: 9 INJECTION, SOLUTION INTRAVENOUS at 01:30

## 2023-10-23 RX ADMIN — LEVOTHYROXINE SODIUM 112 MCG: 0.11 TABLET ORAL at 06:39

## 2023-10-23 ASSESSMENT — ACTIVITIES OF DAILY LIVING (ADL)
ADLS_ACUITY_SCORE: 22

## 2023-10-23 NOTE — PLAN OF CARE
Problem: Adult Inpatient Plan of Care  Goal: Optimal Comfort and Wellbeing  Outcome: Progressing     Problem: Risk for Delirium  Goal: Improved Sleep  Outcome: Progressing     Problem: Pain Acute  Goal: Optimal Pain Control and Function  Outcome: Progressing   Goal Outcome Evaluation:    Patient has been without nausea or pain, NPO since midnight for procedure today. IV fluids running per order, 500mL bolus given overnight for low BP and was effective. Emptying urostomy independently, measuring and recording output on white board. Reported feeling nauseous, no emesis, IV zofran given. Slept well between cares.     Mary Ann Santiago RN

## 2023-10-23 NOTE — PLAN OF CARE
Goal Outcome Evaluation:             Pt tolerating clear liquids, increased diet as tolerated for supper.  Pt denies pain.  Independent in room.

## 2023-10-23 NOTE — PLAN OF CARE
Pt was admitted to P2 unit around 1345. BP elevated, BG has been high, notified Dr Sabillon. Pt is alert and oriented. No nausea or vomiting noted. Denied any abdomen pain.  Independent in the room. Delmy Prieto RN

## 2023-10-23 NOTE — PROGRESS NOTES
[facility]    Medicine Progress Note - Hospitalist Service    Date of Admission:  10/21/2023    Assessment & Plan   84 yo F with h/o hypothryoid, pAfib, HTN, HFpEF, sarcoidosis, bladder CA s/p resxn with ileal conduit, previous SBO requiring surgery, and DM2 on insulin (with DKA while on Keytruda in the past) who presents with recurrent partial SBO and incidentally noted bilateral hydroureteronephrosis with likely stenosis at the junction with the ileal conduit.  Patient was given gastrograffin challenge and has had good results and is feeling much better.      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  Monitor creatinine and for S/S of infection per Urology    Active Problems:    Hypothyroidism  -restart levothyroxine      Paroxysmal atrial fibrillation   -restart baby ASA and atenolol as at home      Essential Hypertension  -restart atenolol as at home      Chronic diastolic congestive heart failure  -no signs of exac, continue atenolol      History of sarcoidosis      H/O bladder cancer resected with ileal conduit  --defer to urology      Type 2 diabetes mellitus with long-term current use of insulin  -has remote h/o DKA while on Keytruda but presumably that was drug induced  - 10/23: Lantus 6 units stat. Lanuts 12 units at bedtime, high sliding scale, I:C 1:12              Diet: Advance Diet as Tolerated: Regular Diet Adult; Moderate Consistent Carb (60 g CHO per Meal) Diet    DVT Prophylaxis: Pneumatic Compression Devices  Alcantara Catheter: Not present  Lines: PRESENT      Port a Cath Single Lumen Right Chest  wall-Site Assessment: WDL      Cardiac Monitoring: None  Code Status: Full Code      Clinically Significant Risk Factors               # Coagulation Defect: INR = 1.30 (Ref range: 0.85 - 1.15) and/or PTT = N/A, will monitor for bleeding    # Hypertension: Noted on problem list       # DMII: A1C = 7.4 % (Ref range: <5.7 %) within past 6 months              Disposition Plan      Expected Discharge Date: 10/24/2023      Destination: home  Discharge Comments: probable discharge after ureteral stents if tolerates oral intake            Malinda Figueroa MD  Hospitalist Service  [facility]  Securely message with [employer] (more info)  Text page via [employer] Paging/Directory   ______________________________________________________________________    Interval History   Patient is new to me today.  Patient is seen and examined at bedside.  No fever, chills.    Physical Exam   Vital Signs: Temp: 97.6  F (36.4  C) Temp src: Oral BP: 104/63 Pulse: 72   Resp: 18 SpO2: 95 % O2 Device: None (Room air)    Weight: 149 lbs .5 oz    GEN: Alert and oriented. Not in acute distress.  HEENT: Atraumatic, mucous membrane- moist and pink.  Chest: Bilateral air entry.  CVS: S1S2 regular.   Abdomen: Soft. Non-tender, non-distended. No organomegaly. No guarding or rigidity. Bowel sounds active.   Extremities: No pedal edema.  CNS: No involuntary movements.  Skin: no cyanosis or clubbing.     Medical Decision Making             Data

## 2023-10-23 NOTE — CONSULTS
[facility]  WOC Nurse Inpatient Assessment     Consulted for: urostomy    Summary: patient has had this ostomy for many years and is completely independent.     Patient History (according to provider note(s):      Mi Paul is a 83 year old female who with a medical history significant for bladder cancer with prior abdominal surgery and prior history of small bowel obstruction that required surgical intervention who is admitted with acute small bowel obstruction.       Assessment:      Assessment of established end Urostomy:        Left one additional pouch for the patient at bedside. She is full comfortable with independent changes. Patient stated she would be discharging tomorrow so will not need more than the one backup pouch. Patient is aware that woc team is available if she has any issues.        Treatment Plan:     Patient is independent with ostomy care and needs no assistance.     Orders:  none needed    RECOMMEND PRIMARY TEAM ORDER: None, at this time  Education provided: plan of care  Discussed plan of care with: Patient  WOC nurse follow-up plan: weekly  Notify WOC if wound(s) deteriorate.  Nursing to notify the Provider(s) and re-consult the WOC Nurse if new skin concern.    DATA:     Current support surface: Standard  Standard gel/foam mattress (IsoFlex, Atmos air, etc)  Containment of urine/stool: Continent of bowel and Urostomy pouch  BMI: Body mass index is 23.7 kg/m .   Active diet order: Orders Placed This Encounter      Advance Diet as Tolerated: Clear Liquid Diet     Output: I/O last 3 completed shifts:  In: 3024.75 [P.O.:540; I.V.:2484.75]  Out: 500 [Urine:500]     Labs:   Recent Labs   Lab 10/23/23  0908 10/22/23  1751 10/21/23  2342   ALBUMIN  --   --  4.0   HGB  --   --  13.5   INR 1.30*  --   --    WBC  --   --  8.4   A1C  --  7.4*  --      Pressure injury risk assessment:   Sensory Perception: 3-->slightly limited  Moisture: 4-->rarely moist  Activity:  4-->walks frequently  Mobility: 4-->no limitation  Nutrition: 3-->adequate  Friction and Shear: 3-->no apparent problem  José Miguel Score: 21    REYMUNDO FloresN RN CWOCN  Pager no longer in use, please contact through [place] group: [facility]appy Group

## 2023-10-23 NOTE — UTILIZATION REVIEW
Admission Status; Secondary Review Determination   Under the authority of the Utilization Management Committee, the utilization review process indicated a secondary review on Mi Paul. The review outcome is based on review of the medical records, discussions with staff, and applying clinical experience noted on the date of the review.   (x) Inpatient Status Appropriate - This patient's medical care is consistent with medical management for inpatient care and reasonable inpatient medical practice.     RATIONALE FOR DETERMINATION   Mi Paul is a 84 yo F with h/o hypothryoid, pAfib, HTN, HFpEF, sarcoidosis, bladder CA s/p resxn with ileal conduit, previous SBO requiring surgery, and DM2 on insulin (with DKA while on Keytruda in the past) who presents with recurrent partial SBO and incidentally noted bilateral hydroureteronephrosis with likely stenosis at the junction with the ileal conduit. Improved after gastrograffin challenge but given findings from urology issues, plan was NPO while having percutaneous nephrostomy tubes placed.  Decision today was to not place them however patient still has not had good oral challenge.  Discussed with Dr. Figueroa and plan is to work on PO today and verify renal function stable and able to tolerate PO.    At the time of admission with the information available to the attending physician more than 2 nights Hospital complex care was anticipated, based on patient risk of adverse outcome if treated as outpatient and complex care required. Inpatient admission is appropriate based on the Medicare guidelines.   The information on this document is developed by the utilization review team in order for the business office to ensure compliance. This only denotes the appropriateness of proper admission status and does not reflect the quality of care rendered.   The definitions of Inpatient Status and Observation Status used in making the determination above are those provided in the  CMS Coverage Manual, Chapter 1 and Chapter 6, section 70.4.   Sincerely,   Bess Uriarte MD  Utilization Review  Physician Advisor  [facility]

## 2023-10-23 NOTE — CONSULTS
"  Interventional Radiology - Pre-Procedure Note:  Inpatient - [facility]  10/23/2023     Procedure Requested: BILATERAL ureteral stent placement  Requested by: Sarah Anton PA-C     Brief HPI: Mi Paul is a 83 year old female with a PMH of CHF, HTN, paroxysmal atrial fib, recurrent SBO, and bladder cancer s/p cystectomy with ileal conduit diversion (about 5 years ago) who was admitted to [facility] on 10/22/2023 with acute SBO. Admitted, General surgery consult - planning on conservative management at this time. BILATERAL hydroureteronephrosis noted on CT 10/22, see imaging below. Urology consulted, recommending BILATERAL ureteral stent (ureterostomy tube) placement.    Per Urology note: \"Patient has no flank pain.  Good UOP.  Creatinine baseline.  Normal WBC and no infectious symptoms. No emergent intervention needed.\" CT demonstrated mild bilateral hydro, see imaging below.     IMAGING:  CT Abdomen Pelvis w Contrast 10/22/2023  EXAM: CT ABDOMEN PELVIS W CONTRAST  LOCATION: [facility]  DATE: 10/22/2023     INDICATION: generalized abdominal pain, bloating, nausea; hx of obstruction  and  urostomy  COMPARISON: CT chest/abdomen/pelvis without contrast 05/01/2023  TECHNIQUE: CT scan of the abdomen and pelvis was performed following injection of IV contrast. Multiplanar reformats were obtained. Dose reduction techniques were used.  CONTRAST: ISOVUE 370 75ML     FINDINGS:   LOWER CHEST: No focal pulmonary consolidation or pleural effusion.     HEPATOBILIARY: Normal.     PANCREAS: Stable mild irregular pancreatic ductal dilatation.     SPLEEN: Normal.     ADRENAL GLANDS: Normal.     KIDNEYS/BLADDER: Cystectomy. Right lower quadrant ileal conduit. Small parastomal hernia containing fat. New mild bilateral hydroureteronephrosis which extends down to level of the ileal conduit. Left renal simple cyst requiring no dedicated follow-up.     BOWEL: Partial small bowel " "resection. Focally dilated loops of small bowel in the pelvis measuring up to 3.4 cm with transition point in the right pelvis.     LYMPH NODES: Normal.     VASCULATURE: Atherosclerotic calcifications of the aortoiliac vessels without evidence of aneurysmal dilatation.     PELVIC ORGANS: Hysterectomy.     MUSCULOSKELETAL: Bilateral hip arthroplasties. Multilevel degenerative changes of the thoracic and lumbosacral spine. No acute osseous abnormality or suspicious bony lesion.                                                                      IMPRESSION:   1.  Focally dilated loops of small bowel in the pelvis measuring up to 3.4 cm with transition point in the right pelvis. Findings of some suspicion for early small bowel obstruction. No free fluid.  2.  Cystectomy with right lower quadrant ileal conduit. New mild bilateral hydroureteronephrosis which extends down to level of the ileal conduit. Findings could reflect a mild stenosis at the level of the conduit.  3.  Small parastomal hernia containing fat.    ANTICOAGULANTS: ASA 81 mg    ALLERGIES  Allergies   Allergen Reactions    Atorvastatin Unknown     \"affected my liver, I had dark urine\"       LABS:  INR   Date Value Ref Range Status   10/23/2023 1.30 (H) 0.85 - 1.15 Final      Hemoglobin   Date Value Ref Range Status   10/21/2023 13.5 11.7 - 15.7 g/dL Final     Platelet Count   Date Value Ref Range Status   10/21/2023 244 150 - 450 10e3/uL Final     Creatinine   Date Value Ref Range Status   10/23/2023 1.12 (H) 0.51 - 0.95 mg/dL Final   10/25/2019 1.08 0.60 - 1.10 mg/dL Final     Potassium   Date Value Ref Range Status   10/22/2023 4.4 3.4 - 5.3 mmol/L Final   03/08/2022 4.6 3.5 - 5.0 mmol/L Final   10/25/2019 4.0 3.5 - 5.0 mmol/L Final         EXAM (per chart review):  /55 (BP Location: Right arm)   Pulse 76   Temp 98  F (36.7  C) (Oral)   Resp 18   Ht 1.689 m (5' 6.5\")   Wt 67.6 kg (149 lb 0.5 oz)   SpO2 94%   BMI 23.70 kg/m  "       ASSESSMENT/PLAN:   Discussed with Dr. Barajas, who reviewed imaging, and Stanley Garay, NP with Urology. IR does not recommend placement of ureterostomy tubes at this time as patient is medically stable. Urology in agreement. Per Stanley Garay, if creatinine increases, Urology will check a loopogram.    Consult completed. Please re-consult IR should the need arise.    Total time spent on the date of the encounter: 45 minutes.      EUGENIA YAO CNP  Interventional Radiology

## 2023-10-23 NOTE — PROGRESS NOTES
General Surgery Progress Note:    Hospital Day # 1    ASSESSMENT:   1. Partial small bowel obstruction (H)    2. Generalized abdominal pain        Mi Paul is a 83 year old female presenting with small bowel obstruction that has resolved.  Gastrografin study is no evidence of high-grade small bowel obstruction.    PLAN:   -Can have diet advance as tolerated once cleared by all other consultants.    -No further surgical intervention from general surgery.  Thank you for allowing us to participate in this patient's medical care.    -Continue medical management per primary      SUBJECTIVE:   Mi Paul was seen on rounds.  Patient underwent Gastrografin challenge and it showed that there is no further small bowel obstruction.  The patient has been passing flatus having bowel movements.    Patient Vitals for the past 24 hrs:   BP Temp Temp src Pulse Resp SpO2   10/23/23 0716 104/55 98  F (36.7  C) Oral 76 18 94 %   10/23/23 0400 112/60 98.1  F (36.7  C) Oral 65 18 99 %   10/23/23 0244 118/57 -- -- -- -- --   10/22/23 2300 98/49 98  F (36.7  C) Oral 68 18 99 %   10/22/23 1924 128/60 98.8  F (37.1  C) Oral 66 18 100 %   10/22/23 1832 (!) 142/65 -- -- 78 -- --   10/22/23 1510 132/63 97.8  F (36.6  C) Oral 73 18 97 %   10/22/23 1409 (!) 149/69 -- -- -- -- --   10/22/23 1345 (!) 168/75 97.8  F (36.6  C) Oral 65 -- 96 %       Physical Exam:  General: NAD, pleasant  CV:RRR  LUNGS:Normal respiratory effort, no accessory muscle use  ABD: Abdomen soft, nondistended, nontender to palpation.  EXT:no CCE    Admission on 10/21/2023   Component Date Value    Sodium 10/21/2023 139     Potassium 10/21/2023 3.9     Carbon Dioxide (CO2) 10/21/2023 29     Anion Gap 10/21/2023 9     Urea Nitrogen 10/21/2023 25.5 (H)     Creatinine 10/21/2023 1.20 (H)     GFR Estimate 10/21/2023 45 (L)     Calcium 10/21/2023 9.8     Chloride 10/21/2023 101     Glucose 10/21/2023 109 (H)     Alkaline Phosphatase 10/21/2023 80     AST 10/21/2023 23      ALT 10/21/2023 15     Protein Total 10/21/2023 6.9     Albumin 10/21/2023 4.0     Bilirubin Total 10/21/2023 0.4     Lactic Acid 10/21/2023 0.8     Lipase 10/21/2023 39     WBC Count 10/21/2023 8.4     RBC Count 10/21/2023 4.30     Hemoglobin 10/21/2023 13.5     Hematocrit 10/21/2023 39.9     MCV 10/21/2023 93     MCH 10/21/2023 31.4     MCHC 10/21/2023 33.8     RDW 10/21/2023 13.2     Platelet Count 10/21/2023 244     Color Urine 10/22/2023 Light Yellow     Appearance Urine 10/22/2023 Turbid (A)     Glucose Urine 10/22/2023 70 (A)     Bilirubin Urine 10/22/2023 Negative     Ketones Urine 10/22/2023 20 (A)     Specific Gravity Urine 10/22/2023 1.026     Blood Urine 10/22/2023 Negative     pH Urine 10/22/2023 7.5 (H)     Protein Albumin Urine 10/22/2023 20 (A)     Urobilinogen Urine 10/22/2023 <2.0     Nitrite Urine 10/22/2023 Positive (A)     Leukocyte Esterase Urine 10/22/2023 Negative     Bacteria Urine 10/22/2023 Moderate (A)     Mucus Urine 10/22/2023 Present (A)     Triple Phosphate Crystal* 10/22/2023 Few (A)     RBC Urine 10/22/2023 0     WBC Urine 10/22/2023 2     Squamous Epithelials Uri* 10/22/2023 <1     GLUCOSE BY METER POCT 10/22/2023 381 (H)     GLUCOSE BY METER POCT 10/22/2023 307 (H)     Hemoglobin A1C 10/22/2023 7.4 (H)     Sodium 10/22/2023 141     Potassium 10/22/2023 4.4     Chloride 10/22/2023 106     Carbon Dioxide (CO2) 10/22/2023 24     Anion Gap 10/22/2023 11     Urea Nitrogen 10/22/2023 23.7 (H)     Creatinine 10/22/2023 1.07 (H)     GFR Estimate 10/22/2023 51 (L)     Calcium 10/22/2023 9.0     Glucose 10/22/2023 340 (H)     GLUCOSE BY METER POCT 10/22/2023 262 (H)     GLUCOSE BY METER POCT 10/23/2023 388 (H)         DO Panfilo Miranda DO  General Surgeon  [facility]  Surgery [address]  [place] [address]?  Office: [phone]  Employed by - [facility] [employer]  Pager: [phone]

## 2023-10-23 NOTE — PROGRESS NOTES
Place of Service:  [facility]     Reason for follow up: Bilateral hydroureteronephrosis; Hx of bladder cancer s/p cystectomy with ileal conduit diversion (2017)    SUBJECTIVE:  Events: no acute events overnight    Pt reports she is feeling better. Denies fever, chills, abdominal and bilateral flank pain, hematuria. Reports good urine from urostomy, patient is emptying bag on her own.    OBJECTIVE:  PHYSICAL EXAM:  Temp: 98  F (36.7  C) Temp src: Oral BP: 104/55 Pulse: 76   Resp: 18 SpO2: 94 % O2 Device: None (Room air)    General: NAD, alert, cooperative  Head: normocephalic, without abnormality / atraumatic  Abdomen: soft, non- tender, non- distended. no suprapubic fullness, no suprapubic tenderness. No bilateral CVA tenderness. Urostomy stoma is pink, urine yellow with some white mucous.   Genitourinary: Deferred.   Musculoskeletal: moves all four extremities equally.   Psychological: alert and oriented, answers questions appropriately    LABS:  Creatinine   Date Value Ref Range Status   10/23/2023 1.12 (H) 0.51 - 0.95 mg/dL Final   10/25/2019 1.08 0.60 - 1.10 mg/dL Final     WBC Count   Date Value Ref Range Status   10/21/2023 8.4 4.0 - 11.0 10e3/uL Final     Hemoglobin   Date Value Ref Range Status   10/21/2023 13.5 11.7 - 15.7 g/dL Final     Platelet Count   Date Value Ref Range Status   10/21/2023 244 150 - 450 10e3/uL Final       UA:  UA RESULTS:  Recent Labs   Lab Test 10/22/23  1211 08/11/20  2240   COLOR Light Yellow Straw   APPEARANCE Turbid* Cloudy*   URINEGLC 70* Negative   URINEBILI Negative Negative   URINEKETONE 20* 20 mg/dL*   SG 1.026 1.048*   UBLD Negative Trace*   URINEPH 7.5* 6.5   PROTEIN 20* Trace*   UROBILINOGEN  --  <2.0 E.U./dL   NITRITE Positive* Positive*   LEUKEST Negative Negative   RBCU 0 3-5*   WBCU 2 0-5     Cultures:  Urine 10/22: pending      Lab Results: personally reviewed.     ASSESSMENT/PLAN:  Mi WOOD Beverly is being seen by [facility] for Bilateral  hydroureteronephrosis; Hx of bladder cancer s/p cystectomy with ileal conduit diversion (2017)    - Previous imaging reviewed, dilatation of bilateral collecting systems and ureters noted back as far as 12/25/19 CT and appears stable.   - Creatinine 1.12 today (baseline 0.96-1.20). No bilateral flank or abdominal pain. Good UO from urostomy. No S&S infection, UA benign, UC pending.   - Case discussed with Dr. Vasquez and IR (Dr. Barajas/XENIA Calvo, CNP. Will not proceed with PNT/Ureterostomy tubes today. Instead, continue to monitor creatinine. If creatinine rises or patient develops S&S infection, then recommend repeat imaging and loopogram.   - Will continue to follow.     Stanley Garay, APRN, CNP  [facility]   [phone]

## 2023-10-24 LAB
ALBUMIN UR-MCNC: NEGATIVE MG/DL
ANION GAP SERPL CALCULATED.3IONS-SCNC: 8 MMOL/L (ref 7–15)
APPEARANCE UR: CLEAR
BACTERIA #/AREA URNS HPF: ABNORMAL /HPF
BACTERIA UR CULT: NORMAL
BASOPHILS # BLD AUTO: 0.1 10E3/UL (ref 0–0.2)
BASOPHILS NFR BLD AUTO: 0 %
BILIRUB UR QL STRIP: NEGATIVE
BUN SERPL-MCNC: 27.1 MG/DL (ref 8–23)
CALCIUM SERPL-MCNC: 8.6 MG/DL (ref 8.8–10.2)
CHLORIDE SERPL-SCNC: 107 MMOL/L (ref 98–107)
COLOR UR AUTO: COLORLESS
CREAT SERPL-MCNC: 1.19 MG/DL (ref 0.51–0.95)
DEPRECATED HCO3 PLAS-SCNC: 23 MMOL/L (ref 22–29)
EGFRCR SERPLBLD CKD-EPI 2021: 45 ML/MIN/1.73M2
EOSINOPHIL # BLD AUTO: 0 10E3/UL (ref 0–0.7)
EOSINOPHIL NFR BLD AUTO: 0 %
ERYTHROCYTE [DISTWIDTH] IN BLOOD BY AUTOMATED COUNT: 13.4 % (ref 10–15)
GLUCOSE BLDC GLUCOMTR-MCNC: 103 MG/DL (ref 70–99)
GLUCOSE BLDC GLUCOMTR-MCNC: 118 MG/DL (ref 70–99)
GLUCOSE BLDC GLUCOMTR-MCNC: 122 MG/DL (ref 70–99)
GLUCOSE BLDC GLUCOMTR-MCNC: 99 MG/DL (ref 70–99)
GLUCOSE SERPL-MCNC: 145 MG/DL (ref 70–99)
GLUCOSE UR STRIP-MCNC: NEGATIVE MG/DL
HCT VFR BLD AUTO: 33.2 % (ref 35–47)
HGB BLD-MCNC: 11.1 G/DL (ref 11.7–15.7)
HGB UR QL STRIP: NEGATIVE
IMM GRANULOCYTES # BLD: 0.1 10E3/UL
IMM GRANULOCYTES NFR BLD: 1 %
KETONES UR STRIP-MCNC: NEGATIVE MG/DL
LEUKOCYTE ESTERASE UR QL STRIP: NEGATIVE
LYMPHOCYTES # BLD AUTO: 2 10E3/UL (ref 0.8–5.3)
LYMPHOCYTES NFR BLD AUTO: 14 %
MCH RBC QN AUTO: 31.9 PG (ref 26.5–33)
MCHC RBC AUTO-ENTMCNC: 33.4 G/DL (ref 31.5–36.5)
MCV RBC AUTO: 95 FL (ref 78–100)
MONOCYTES # BLD AUTO: 0.7 10E3/UL (ref 0–1.3)
MONOCYTES NFR BLD AUTO: 5 %
MUCOUS THREADS #/AREA URNS LPF: PRESENT /LPF
NEUTROPHILS # BLD AUTO: 11.4 10E3/UL (ref 1.6–8.3)
NEUTROPHILS NFR BLD AUTO: 80 %
NITRATE UR QL: NEGATIVE
NRBC # BLD AUTO: 0 10E3/UL
NRBC BLD AUTO-RTO: 0 /100
PH UR STRIP: 5 [PH] (ref 5–7)
PLATELET # BLD AUTO: 197 10E3/UL (ref 150–450)
POTASSIUM SERPL-SCNC: 4.1 MMOL/L (ref 3.4–5.3)
PROCALCITONIN SERPL IA-MCNC: 0.63 NG/ML
RBC # BLD AUTO: 3.48 10E6/UL (ref 3.8–5.2)
RBC URINE: 0 /HPF
SODIUM SERPL-SCNC: 138 MMOL/L (ref 135–145)
SP GR UR STRIP: 1.01 (ref 1–1.03)
UROBILINOGEN UR STRIP-MCNC: <2 MG/DL
WBC # BLD AUTO: 14.3 10E3/UL (ref 4–11)
WBC URINE: 1 /HPF

## 2023-10-24 PROCEDURE — 80048 BASIC METABOLIC PNL TOTAL CA: CPT | Performed by: STUDENT IN AN ORGANIZED HEALTH CARE EDUCATION/TRAINING PROGRAM

## 2023-10-24 PROCEDURE — 85025 COMPLETE CBC W/AUTO DIFF WBC: CPT | Performed by: STUDENT IN AN ORGANIZED HEALTH CARE EDUCATION/TRAINING PROGRAM

## 2023-10-24 PROCEDURE — 250N000011 HC RX IP 250 OP 636: Mod: JZ | Performed by: STUDENT IN AN ORGANIZED HEALTH CARE EDUCATION/TRAINING PROGRAM

## 2023-10-24 PROCEDURE — 99232 SBSQ HOSP IP/OBS MODERATE 35: CPT | Performed by: STUDENT IN AN ORGANIZED HEALTH CARE EDUCATION/TRAINING PROGRAM

## 2023-10-24 PROCEDURE — 258N000003 HC RX IP 258 OP 636: Performed by: STUDENT IN AN ORGANIZED HEALTH CARE EDUCATION/TRAINING PROGRAM

## 2023-10-24 PROCEDURE — 36415 COLL VENOUS BLD VENIPUNCTURE: CPT | Performed by: STUDENT IN AN ORGANIZED HEALTH CARE EDUCATION/TRAINING PROGRAM

## 2023-10-24 PROCEDURE — 81001 URINALYSIS AUTO W/SCOPE: CPT | Performed by: STUDENT IN AN ORGANIZED HEALTH CARE EDUCATION/TRAINING PROGRAM

## 2023-10-24 PROCEDURE — 120N000001 HC R&B MED SURG/OB

## 2023-10-24 PROCEDURE — 258N000003 HC RX IP 258 OP 636: Performed by: INTERNAL MEDICINE

## 2023-10-24 PROCEDURE — 84145 PROCALCITONIN (PCT): CPT | Performed by: STUDENT IN AN ORGANIZED HEALTH CARE EDUCATION/TRAINING PROGRAM

## 2023-10-24 PROCEDURE — 250N000013 HC RX MED GY IP 250 OP 250 PS 637: Performed by: INTERNAL MEDICINE

## 2023-10-24 RX ORDER — CEFTRIAXONE 1 G/1
1 INJECTION, POWDER, FOR SOLUTION INTRAMUSCULAR; INTRAVENOUS EVERY 24 HOURS
Status: DISCONTINUED | OUTPATIENT
Start: 2023-10-24 | End: 2023-10-25

## 2023-10-24 RX ADMIN — SODIUM CHLORIDE: 9 INJECTION, SOLUTION INTRAVENOUS at 17:26

## 2023-10-24 RX ADMIN — EZETIMIBE 10 MG: 10 TABLET ORAL at 09:11

## 2023-10-24 RX ADMIN — SODIUM CHLORIDE: 9 INJECTION, SOLUTION INTRAVENOUS at 05:09

## 2023-10-24 RX ADMIN — LEVOTHYROXINE SODIUM 112 MCG: 0.11 TABLET ORAL at 06:40

## 2023-10-24 RX ADMIN — Medication 500 MG: at 21:02

## 2023-10-24 RX ADMIN — CEFTRIAXONE SODIUM 1 G: 1 INJECTION, POWDER, FOR SOLUTION INTRAMUSCULAR; INTRAVENOUS at 21:02

## 2023-10-24 RX ADMIN — Medication 81 MG: at 21:02

## 2023-10-24 ASSESSMENT — ACTIVITIES OF DAILY LIVING (ADL)
ADLS_ACUITY_SCORE: 22

## 2023-10-24 NOTE — PLAN OF CARE
Problem: Adult Inpatient Plan of Care  Goal: Plan of Care Review  Description: The Plan of Care Review/Shift note should be completed every shift.  The Outcome Evaluation is a brief statement about your assessment that the patient is improving, declining, or no change.  This information will be displayed automatically on your shift  note.  Outcome: Progressing   Goal Outcome Evaluation:    Patient is alert and oriented. Slept majority of the night. She denies pain. Port a cath accessed with NS infusing at 125mL/hr, no blood return. She is independent in her room. ACHS. 60g carb diet. RA. Vital signs stable.  Laura Mirza RN

## 2023-10-24 NOTE — PROGRESS NOTES
[facility]    Medicine Progress Note - Hospitalist Service    Date of Admission:  10/21/2023    Assessment & Plan   84 yo F with h/o hypothryoid, pAfib, HTN, HFpEF, sarcoidosis, bladder CA s/p resxn with ileal conduit, previous SBO requiring surgery, and DM2 on insulin (with DKA while on Keytruda in the past) who presents with recurrent partial SBO and incidentally noted bilateral hydroureteronephrosis with likely stenosis at the junction with the ileal conduit.  Patient was given gastrograffin challenge and has had good results and is feeling much better.      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  Monitor creatinine and for S/S of infection per Urology      UTI  --U/A: Positive nitrite. Wbc increased to 14 form 8. Procal: 0.63. Eventhough Ucx is contaminated, will start ceftriaxone empirically. Discussed with urology.    Active Problems:    Hypothyroidism  -restart levothyroxine      Paroxysmal atrial fibrillation   -restart baby ASA and atenolol as at home      Essential Hypertension  -restart atenolol as at home      Chronic diastolic congestive heart failure  -no signs of exac, continue atenolol      History of sarcoidosis      H/O bladder cancer resected with ileal conduit  --defer to urology      Type 2 diabetes mellitus with long-term current use of insulin  -has remote h/o DKA while on Keytruda but presumably that was drug induced  - 10/23: Lantus 6 units stat. Lanuts 12 units at bedtime, high sliding scale, I:C 1:12              Diet: Advance Diet as Tolerated: Regular Diet Adult; Moderate Consistent  Carb (60 g CHO per Meal) Diet    DVT Prophylaxis: Pneumatic Compression Devices  Alcantara Catheter: Not present  Lines: PRESENT      Port a Cath Single Lumen Right Chest wall-Site Assessment: WDL      Cardiac Monitoring: None  Code Status: Full Code      Clinically Significant Risk Factors               # Coagulation Defect: INR = 1.30 (Ref range: 0.85 - 1.15) and/or PTT = N/A, will monitor for bleeding    # Hypertension: Noted on problem list       # DMII: A1C = 7.4 % (Ref range: <5.7 %) within past 6 months              Disposition Plan      Expected Discharge Date: 10/25/2023      Destination: home  Discharge Comments: probable discharge after ureteral stents if tolerates oral intake            Malinda Figueroa MD  Hospitalist Service  [facility]  Securely message with [employer] (more info)  Text page via [employer] Paging/Directory   ______________________________________________________________________    Interval History   Patient is seen and examined at bedside.  No fever, chills.    Physical Exam   Vital Signs: Temp: 98.1  F (36.7  C) Temp src: Oral BP: 111/74 Pulse: 65   Resp: 20 SpO2: 96 % O2 Device: None (Room air)    Weight: 149 lbs .5 oz    GEN: Alert and oriented. Not in acute distress.  HEENT: Atraumatic, mucous membrane- moist and pink.  Chest: Bilateral air entry.  CVS: S1S2 regular.   Abdomen: Soft. Non-tender, non-distended. No organomegaly. No guarding or rigidity. Bowel sounds active.   Extremities: No pedal edema.  CNS: No involuntary movements.  Skin: no cyanosis or clubbing.     Medical Decision Making             Data

## 2023-10-24 NOTE — PROGRESS NOTES
SPIRITUAL HEALTH SERVICES Progress Note    Saw pt Mi Paul and offered [employer] sacrament of anointing for the healing of the sick.     Fr. Rodney Burnett

## 2023-10-24 NOTE — PROGRESS NOTES
Place of Service:  [facility]     Reason for follow up: Bilateral hydroureteronephrosis; Hx of bladder cancer s/p cystectomy with ileal conduit diversion (2017)    SUBJECTIVE:  Events: no acute events overnight    Pt reports she is feeling good and hoping to go home tomorrow. Denies fever, chills, abdominal and bilateral flank pain, hematuria. Reports good urine from urostomy, patient is emptying bag on her own.    OBJECTIVE:  PHYSICAL EXAM:  Temp: 97.6  F (36.4  C) Temp src: Oral BP: 91/47 Pulse: 64   Resp: 18 SpO2: 99 % O2 Device: None (Room air)    General: NAD, alert, cooperative, sitting up in chair.   Head: normocephalic, without abnormality / atraumatic  Abdomen: soft, non- tender, non- distended. no suprapubic fullness, no suprapubic tenderness. No bilateral CVA tenderness. Urostomy stoma is pink, urine yellow with some white mucous.   Genitourinary: Deferred.   Musculoskeletal: moves all four extremities equally.   Psychological: alert and oriented, answers questions appropriately    LABS:  Creatinine   Date Value Ref Range Status   10/24/2023 1.19 (H) 0.51 - 0.95 mg/dL Final   10/25/2019 1.08 0.60 - 1.10 mg/dL Final     WBC Count   Date Value Ref Range Status   10/24/2023 14.3 (H) 4.0 - 11.0 10e3/uL Final     Hemoglobin   Date Value Ref Range Status   10/24/2023 11.1 (L) 11.7 - 15.7 g/dL Final     Platelet Count   Date Value Ref Range Status   10/24/2023 197 150 - 450 10e3/uL Final       UA:  UA RESULTS:  Recent Labs   Lab Test 10/22/23  1211 08/11/20  2240   COLOR Light Yellow Straw   APPEARANCE Turbid* Cloudy*   URINEGLC 70* Negative   URINEBILI Negative Negative   URINEKETONE 20* 20 mg/dL*   SG 1.026 1.048*   UBLD Negative Trace*   URINEPH 7.5* 6.5   PROTEIN 20* Trace*   UROBILINOGEN  --  <2.0 E.U./dL   NITRITE Positive* Positive*   LEUKEST Negative Negative   RBCU 0 3-5*   WBCU 2 0-5     Cultures:  Urine 10/22: >100k mix of urogenital mg      Lab Results: personally reviewed.      ASSESSMENT/PLAN:  Mi Paul is being seen by [facility] for Bilateral hydroureteronephrosis; Hx of bladder cancer s/p cystectomy with ileal conduit diversion (2017)    - Previous imaging reviewed, dilatation of bilateral collecting systems and ureters noted back as far as 12/25/19 CT and appears stable.   - Creatinine 1.19 today (baseline 0.96-1.20). No bilateral flank or abdominal pain. Good UO from urostomy.   - WBC 14.3 today, 8.4 on 10/21. No other S&S infection, UA benign, UC: >100 mix urogenital mg, drawn from urostomy. Uncertain etiology for leukocytosis, rec recheck in AM.  - If creatinine rises or patient develops S&S infection, then recommend repeat imaging and loopogram.   - Will continue to follow.     Stanley Garay, APRN, CNP  [facility]   [phone]

## 2023-10-24 NOTE — PLAN OF CARE
Goal Outcome Evaluation:         Patient denies pain.  Pt is eating and drinking WDL.  Pt showered today.  Pt's sister visiting this afternoon. Pt independent with urostomy, will leave output for staff to record.

## 2023-10-25 VITALS
HEART RATE: 62 BPM | RESPIRATION RATE: 12 BRPM | HEIGHT: 66 IN | BODY MASS INDEX: 23.95 KG/M2 | DIASTOLIC BLOOD PRESSURE: 57 MMHG | WEIGHT: 149.03 LBS | OXYGEN SATURATION: 92 % | SYSTOLIC BLOOD PRESSURE: 118 MMHG | TEMPERATURE: 97.7 F

## 2023-10-25 LAB
ANION GAP SERPL CALCULATED.3IONS-SCNC: 7 MMOL/L (ref 7–15)
BASOPHILS # BLD AUTO: 0 10E3/UL (ref 0–0.2)
BASOPHILS NFR BLD AUTO: 1 %
BUN SERPL-MCNC: 17.4 MG/DL (ref 8–23)
CALCIUM SERPL-MCNC: 8 MG/DL (ref 8.8–10.2)
CHLORIDE SERPL-SCNC: 107 MMOL/L (ref 98–107)
CREAT SERPL-MCNC: 1.01 MG/DL (ref 0.51–0.95)
DEPRECATED HCO3 PLAS-SCNC: 28 MMOL/L (ref 22–29)
EGFRCR SERPLBLD CKD-EPI 2021: 55 ML/MIN/1.73M2
EOSINOPHIL # BLD AUTO: 0.2 10E3/UL (ref 0–0.7)
EOSINOPHIL NFR BLD AUTO: 2 %
ERYTHROCYTE [DISTWIDTH] IN BLOOD BY AUTOMATED COUNT: 13.5 % (ref 10–15)
GLUCOSE BLDC GLUCOMTR-MCNC: 97 MG/DL (ref 70–99)
GLUCOSE BLDC GLUCOMTR-MCNC: 99 MG/DL (ref 70–99)
GLUCOSE SERPL-MCNC: 100 MG/DL (ref 70–99)
HCT VFR BLD AUTO: 35.4 % (ref 35–47)
HGB BLD-MCNC: 11.9 G/DL (ref 11.7–15.7)
IMM GRANULOCYTES # BLD: 0 10E3/UL
IMM GRANULOCYTES NFR BLD: 0 %
LYMPHOCYTES # BLD AUTO: 1.5 10E3/UL (ref 0.8–5.3)
LYMPHOCYTES NFR BLD AUTO: 21 %
MCH RBC QN AUTO: 31.5 PG (ref 26.5–33)
MCHC RBC AUTO-ENTMCNC: 33.6 G/DL (ref 31.5–36.5)
MCV RBC AUTO: 94 FL (ref 78–100)
MONOCYTES # BLD AUTO: 0.6 10E3/UL (ref 0–1.3)
MONOCYTES NFR BLD AUTO: 8 %
NEUTROPHILS # BLD AUTO: 4.8 10E3/UL (ref 1.6–8.3)
NEUTROPHILS NFR BLD AUTO: 68 %
NRBC # BLD AUTO: 0 10E3/UL
NRBC BLD AUTO-RTO: 0 /100
PLATELET # BLD AUTO: 208 10E3/UL (ref 150–450)
POTASSIUM SERPL-SCNC: 4.1 MMOL/L (ref 3.4–5.3)
RBC # BLD AUTO: 3.78 10E6/UL (ref 3.8–5.2)
SODIUM SERPL-SCNC: 142 MMOL/L (ref 135–145)
WBC # BLD AUTO: 7.1 10E3/UL (ref 4–11)

## 2023-10-25 PROCEDURE — 99239 HOSP IP/OBS DSCHRG MGMT >30: CPT | Performed by: STUDENT IN AN ORGANIZED HEALTH CARE EDUCATION/TRAINING PROGRAM

## 2023-10-25 PROCEDURE — 80048 BASIC METABOLIC PNL TOTAL CA: CPT | Performed by: STUDENT IN AN ORGANIZED HEALTH CARE EDUCATION/TRAINING PROGRAM

## 2023-10-25 PROCEDURE — 250N000011 HC RX IP 250 OP 636: Mod: JZ | Performed by: STUDENT IN AN ORGANIZED HEALTH CARE EDUCATION/TRAINING PROGRAM

## 2023-10-25 PROCEDURE — 85025 COMPLETE CBC W/AUTO DIFF WBC: CPT | Performed by: STUDENT IN AN ORGANIZED HEALTH CARE EDUCATION/TRAINING PROGRAM

## 2023-10-25 PROCEDURE — 36415 COLL VENOUS BLD VENIPUNCTURE: CPT | Performed by: STUDENT IN AN ORGANIZED HEALTH CARE EDUCATION/TRAINING PROGRAM

## 2023-10-25 PROCEDURE — 250N000013 HC RX MED GY IP 250 OP 250 PS 637: Performed by: INTERNAL MEDICINE

## 2023-10-25 PROCEDURE — 258N000003 HC RX IP 258 OP 636: Performed by: STUDENT IN AN ORGANIZED HEALTH CARE EDUCATION/TRAINING PROGRAM

## 2023-10-25 RX ORDER — HEPARIN SODIUM (PORCINE) LOCK FLUSH IV SOLN 100 UNIT/ML 100 UNIT/ML
5-10 SOLUTION INTRAVENOUS
Status: DISCONTINUED | OUTPATIENT
Start: 2023-10-25 | End: 2023-10-25 | Stop reason: HOSPADM

## 2023-10-25 RX ADMIN — SODIUM CHLORIDE: 9 INJECTION, SOLUTION INTRAVENOUS at 05:41

## 2023-10-25 RX ADMIN — Medication 81 MG: at 08:50

## 2023-10-25 RX ADMIN — ATENOLOL 12.5 MG: 25 TABLET ORAL at 08:50

## 2023-10-25 RX ADMIN — LEVOTHYROXINE SODIUM 112 MCG: 0.11 TABLET ORAL at 05:39

## 2023-10-25 RX ADMIN — Medication 500 MG: at 08:50

## 2023-10-25 RX ADMIN — EZETIMIBE 10 MG: 10 TABLET ORAL at 09:27

## 2023-10-25 RX ADMIN — Medication 5 ML: at 11:57

## 2023-10-25 ASSESSMENT — ACTIVITIES OF DAILY LIVING (ADL)
ADLS_ACUITY_SCORE: 22

## 2023-10-25 NOTE — DISCHARGE SUMMARY
[facility]  Hospitalist Discharge Summary      Date of Admission:  10/21/2023  Date of Discharge:  10/25/2023  Discharging Provider: Malinda Figueroa MD  Discharge Service: Hospitalist Service    Discharge Diagnoses   Partial small bowel obstruction    Clinically Significant Risk Factors     # DMII: A1C = 7.4 % (Ref range: <5.7 %) within past 6 months       Follow-ups Needed After Discharge   Follow-up Appointments     Follow-up and recommended labs and tests       Follow up with primary care provider (PCP), Malik Rutherford, within 5-7   days for hospital follow- up.  The following labs/tests are recommended:   cbc in 3 days. Urology follow up as scheduled.            Unresulted Labs Ordered in the Past 30 Days of this Admission       No orders found from 9/21/2023 to 10/22/2023.            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  Patient was given gastrograffin challenge and has had good results and is feeling much better.      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  Monitor creatinine and for S/S of infection per Urology    UTI- ruled out  --U/A: Positive nitrite. Wbc increased to 14 form 8. Procal: 0.63.   -- Repeat U/A: clean catch-  negative. Repeat CBC  normal. No need for antibiotics.    Hypothyroidism  -restart levothyroxine    Paroxysmal atrial fibrillation   -restart baby ASA and atenolol as at home    Essential Hypertension  -restart atenolol as at home    Chronic diastolic congestive heart failure  -no signs of exac, continue atenolol    History of sarcoidosis    H/O bladder cancer resected with ileal conduit  --defer to urology    Type 2 diabetes mellitus with long-term current use of insulin  -has remote h/o DKA while on Keytruda but presumably that was drug induced  - 10/23: Lantus 6 units stat. Lanuts 12 units at bedtime, high sliding scale, I:C 1:12  Patient is clinically and hemodynamically stable for discharge to home. Medication reconciliation was done. Follow up appointments and recommendations as shown below. Patient verbalized understanding and agreed to plan of care. All questions answered.      Consultations This Hospital Stay   SURGERY GENERAL IP CONSULT  WOUND OSTOMY CONTINENCE NURSE  IP CONSULT  NUTRITION SERVICES ADULT IP CONSULT  UROLOGY IP CONSULT  CARE MANAGEMENT / SOCIAL WORK IP CONSULT  INTERVENTIONAL RADIOLOGY ADULT/PEDS IP CONSULT    Code Status   Full Code    Time Spent on this Encounter   IMalinda MD, personally saw the patient today and spent greater than 30 minutes discharging this patient.       Malinda Figueroa MD  [address] [address]  Phone: [phone]  Fax: [phone]  ______________________________________________________________________    Physical Exam   Vital Signs: Temp: 97.7  F (36.5  C) Temp src: Oral BP: 118/57 Pulse: 62   Resp: 12 SpO2: 92 % O2 Device: None (Room air)    Weight: 149 lbs .5 oz  GEN: Alert and oriented. Not in acute distress.  HEENT: Atraumatic, mucous membrane- moist and pink.  Chest: Bilateral air entry.  CVS: S1S2 regular.   Abdomen: Soft. Non-tender, non-distended. No organomegaly. No guarding or  rigidity. Bowel sounds active.   ART: yellowish urine in urostomy bag  Extremities: No pedal edema.  CNS: No involuntary movements.  Skin: no cyanosis or clubbing.        Primary Care Physician   Malik Rutherford    Discharge Orders      Reason for your hospital stay    Partial small bowel obstruction     Follow-up and recommended labs and tests     Follow up with primary care provider (PCP), Malik Rutherford, within 5-7 days for hospital follow- up.  The following labs/tests are recommended: cbc in 3 days. Urology follow up as scheduled.     Activity    Your activity upon discharge: activity as tolerated     Diet    Follow this diet upon discharge: Orders Placed This Encounter      Advance Diet as Tolerated: Regular Diet Adult; Moderate Consistent Carb (60 g CHO per Meal) Diet       Significant Results and Procedures       Discharge Medications   Current Discharge Medication List        CONTINUE these medications which have NOT CHANGED    Details   aspirin 81 MG EC tablet Take 81 mg by mouth at bedtime      atenolol (TENORMIN) 25 MG tablet Take 12.5 mg by mouth daily      docusate sodium (COLACE) 100 MG capsule Take 100 mg by mouth as needed for constipation      ezetimibe (ZETIA) 10 MG tablet Take 10 mg by mouth every morning      Insulin Aspart FlexPen 100 UNIT/ML SOPN INJECT 8 UNIT SUBCUTANEOUSLY AT BREAKFAST,6 AT LUNCH&9 AT DINNER PLUS SLIDING SCALE-MAX 30 UNIT/DAY      LANTUS SOLOSTAR 100 UNIT/ML soln Inject 12 Units Subcutaneous at bedtime      levothyroxine (SYNTHROID/LEVOTHROID) 112 MCG tablet Take 112 mcg by mouth daily      magnesium gluconate (MAGONATE) 500 (27 Mg) MG tablet Take by mouth every morning Previously was taking 500 mg now taking 600 mg new dose = 1200 mg      multivitamin (THERMEMS) TABS Take 1 tablet by mouth every evening       polyethylene glycol (MIRALAX/GLYCOLAX) powder Take 1 capful by mouth daily as needed for constipation (mix with 8 ounces of liquid)    Associated Diagnoses:  "Constipation, unspecified constipation type      blood glucose (ONETOUCH VERIO IQ) test strip Use to test blood sugar 4 times daily or as directed.      blood glucose monitoring (ONE TOUCH DELICA) lancets Use to test blood sugar 4 times daily or as directed.      blood glucose monitoring (ONETOUCH VERIO) meter device kit Use to test blood sugar 4 times daily or as directed.      Continuous Blood Gluc  (FREESTYLE TYLER 14 DAY READER) GERBER Use as directed to scan sensor and check blood sugar at least 4 times a day    Associated Diagnoses: Type 1 diabetes mellitus without complication (H)      Continuous Blood Gluc Sensor (FREESTYLE TYLER 14 DAY SENSOR) MISC Apply to arm as directed, scan sensor to check blood sugar at least once every 8 hours. Replace sensor every 14 days.    Associated Diagnoses: Type 1 diabetes mellitus without complication (H)           Allergies   Allergies   Allergen Reactions    Atorvastatin Unknown     \"affected my liver, I had dark urine\"     "

## 2023-10-25 NOTE — PLAN OF CARE
Problem: Adult Inpatient Plan of Care  Goal: Readiness for Transition of Care  Outcome: Progressing     Problem: Nausea and Vomiting  Goal: Nausea and Vomiting Relief  Outcome: Progressing     Problem: Pain Acute  Goal: Optimal Pain Control and Function  Outcome: Progressing   Goal Outcome Evaluation:    A&Ox4, VSS, RA. Up indep, ambulated unit multiple times. Was able to get clean catch UA from ostomy. Better results than previous. Bag changed. BG 99. IV abx. Pt eager to discharge.

## 2023-10-25 NOTE — PROGRESS NOTES
CLINICAL NUTRITION SERVICES - EDUCATION FOLLOW-UP NOTE   Pt educated on a low fiber diet on 10/22    Met with pt, understands diet and does not have any questions. Does like to eat nuts and whole wheat bread d/t hx of diabetes. Understands she needs to start limiting these items. No further education needed    Follow Up:  LOS

## 2023-10-25 NOTE — PROGRESS NOTES
Care Management Discharge Note    Discharge Date: 10/25/2023       Discharge Disposition: Home    Discharge Services:      Discharge DME:      Discharge Transportation: family or friend will provide    Private pay costs discussed: Not applicable    Does the patient's insurance plan have a 3 day qualifying hospital stay waiver?  Yes     Which insurance plan 3 day waiver is available? Alternative insurance waiver    Will the waiver be used for post-acute placement? No    PAS Confirmation Code:    Patient/family educated on Medicare website which has current facility and service quality ratings:      Education Provided on the Discharge Plan:    Persons Notified of Discharge Plans: patient   Patient/Family in Agreement with the Plan:      Handoff Referral Completed: Yes    Additional Information:  Chart reviewed. Home today. No care management needs identified     Abiola Roberto RN

## 2023-10-31 ENCOUNTER — LAB REQUISITION (OUTPATIENT)
Dept: LAB | Facility: CLINIC | Age: 83
End: 2023-10-31

## 2023-10-31 DIAGNOSIS — N13.30 UNSPECIFIED HYDRONEPHROSIS: ICD-10-CM

## 2023-10-31 DIAGNOSIS — N18.31 CHRONIC KIDNEY DISEASE, STAGE 3A (H): ICD-10-CM

## 2023-10-31 DIAGNOSIS — Z87.19 PERSONAL HISTORY OF OTHER DISEASES OF THE DIGESTIVE SYSTEM: ICD-10-CM

## 2023-10-31 PROCEDURE — 82310 ASSAY OF CALCIUM: CPT | Performed by: STUDENT IN AN ORGANIZED HEALTH CARE EDUCATION/TRAINING PROGRAM

## 2023-11-01 LAB
ANION GAP SERPL CALCULATED.3IONS-SCNC: 10 MMOL/L (ref 7–15)
BUN SERPL-MCNC: 23 MG/DL (ref 8–23)
CALCIUM SERPL-MCNC: 9.4 MG/DL (ref 8.8–10.2)
CHLORIDE SERPL-SCNC: 98 MMOL/L (ref 98–107)
CREAT SERPL-MCNC: 1.13 MG/DL (ref 0.51–0.95)
DEPRECATED HCO3 PLAS-SCNC: 28 MMOL/L (ref 22–29)
EGFRCR SERPLBLD CKD-EPI 2021: 48 ML/MIN/1.73M2
GLUCOSE SERPL-MCNC: 264 MG/DL (ref 70–99)
POTASSIUM SERPL-SCNC: 4.4 MMOL/L (ref 3.4–5.3)
SODIUM SERPL-SCNC: 136 MMOL/L (ref 135–145)

## 2024-05-05 ENCOUNTER — HEALTH MAINTENANCE LETTER (OUTPATIENT)
Age: 84
End: 2024-05-05

## 2024-07-14 ENCOUNTER — HEALTH MAINTENANCE LETTER (OUTPATIENT)
Age: 84
End: 2024-07-14

## 2024-11-30 ENCOUNTER — HEALTH MAINTENANCE LETTER (OUTPATIENT)
Age: 84
End: 2024-11-30

## 2025-06-07 ENCOUNTER — HEALTH MAINTENANCE LETTER (OUTPATIENT)
Age: 85
End: 2025-06-07

## 2025-07-19 ENCOUNTER — HEALTH MAINTENANCE LETTER (OUTPATIENT)
Age: 85
End: 2025-07-19